# Patient Record
Sex: MALE | Race: WHITE | NOT HISPANIC OR LATINO | Employment: OTHER | URBAN - METROPOLITAN AREA
[De-identification: names, ages, dates, MRNs, and addresses within clinical notes are randomized per-mention and may not be internally consistent; named-entity substitution may affect disease eponyms.]

---

## 2020-09-08 ENCOUNTER — APPOINTMENT (OUTPATIENT)
Dept: RADIOLOGY | Facility: MEDICAL CENTER | Age: 65
DRG: 066 | End: 2020-09-08
Attending: EMERGENCY MEDICINE
Payer: MEDICARE

## 2020-09-08 ENCOUNTER — APPOINTMENT (OUTPATIENT)
Dept: CARDIOLOGY | Facility: MEDICAL CENTER | Age: 65
DRG: 066 | End: 2020-09-08
Attending: HOSPITALIST
Payer: MEDICARE

## 2020-09-08 ENCOUNTER — HOSPITAL ENCOUNTER (INPATIENT)
Facility: MEDICAL CENTER | Age: 65
LOS: 2 days | DRG: 066 | End: 2020-09-10
Attending: EMERGENCY MEDICINE | Admitting: HOSPITALIST
Payer: MEDICARE

## 2020-09-08 DIAGNOSIS — I63.9 CEREBROVASCULAR ACCIDENT (CVA), UNSPECIFIED MECHANISM (HCC): ICD-10-CM

## 2020-09-08 DIAGNOSIS — I63.9 ACUTE CVA (CEREBROVASCULAR ACCIDENT) (HCC): ICD-10-CM

## 2020-09-08 DIAGNOSIS — R47.01 EXPRESSIVE APHASIA: ICD-10-CM

## 2020-09-08 PROBLEM — E66.9 OBESITY: Status: ACTIVE | Noted: 2020-09-08

## 2020-09-08 PROBLEM — D72.829 LEUKOCYTOSIS: Status: ACTIVE | Noted: 2020-09-08

## 2020-09-08 PROBLEM — Z86.711 HISTORY OF PULMONARY EMBOLISM: Status: ACTIVE | Noted: 2020-09-08

## 2020-09-08 PROBLEM — R73.9 HYPERGLYCEMIA: Status: ACTIVE | Noted: 2020-09-08

## 2020-09-08 LAB
ABO + RH BLD: NORMAL
ABO GROUP BLD: NORMAL
ALBUMIN SERPL BCP-MCNC: 4.4 G/DL (ref 3.2–4.9)
ALBUMIN/GLOB SERPL: 1 G/DL
ALP SERPL-CCNC: 80 U/L (ref 30–99)
ALT SERPL-CCNC: 32 U/L (ref 2–50)
ANION GAP SERPL CALC-SCNC: 16 MMOL/L (ref 7–16)
APPEARANCE UR: CLEAR
APTT PPP: 24.9 SEC (ref 24.7–36)
AST SERPL-CCNC: 25 U/L (ref 12–45)
BACTERIA #/AREA URNS HPF: ABNORMAL /HPF
BASOPHILS # BLD AUTO: 0.2 % (ref 0–1.8)
BASOPHILS # BLD: 0.03 K/UL (ref 0–0.12)
BILIRUB SERPL-MCNC: 0.6 MG/DL (ref 0.1–1.5)
BILIRUB UR QL STRIP.AUTO: NEGATIVE
BLD GP AB SCN SERPL QL: NORMAL
BUN SERPL-MCNC: 19 MG/DL (ref 8–22)
CALCIUM SERPL-MCNC: 9.8 MG/DL (ref 8.4–10.2)
CHLORIDE SERPL-SCNC: 101 MMOL/L (ref 96–112)
CK MB SERPL-MCNC: 1.8 NG/ML (ref 0–5)
CO2 SERPL-SCNC: 22 MMOL/L (ref 20–33)
COLOR UR: YELLOW
COVID ORDER STATUS COVID19: NORMAL
CREAT SERPL-MCNC: 1.2 MG/DL (ref 0.5–1.4)
EKG IMPRESSION: NORMAL
EOSINOPHIL # BLD AUTO: 0.01 K/UL (ref 0–0.51)
EOSINOPHIL NFR BLD: 0.1 % (ref 0–6.9)
EPI CELLS #/AREA URNS HPF: ABNORMAL /HPF
ERYTHROCYTE [DISTWIDTH] IN BLOOD BY AUTOMATED COUNT: 39.9 FL (ref 35.9–50)
EST. AVERAGE GLUCOSE BLD GHB EST-MCNC: 117 MG/DL
GLOBULIN SER CALC-MCNC: 4.4 G/DL (ref 1.9–3.5)
GLUCOSE BLD-MCNC: 111 MG/DL (ref 65–99)
GLUCOSE SERPL-MCNC: 121 MG/DL (ref 65–99)
GLUCOSE UR STRIP.AUTO-MCNC: NEGATIVE MG/DL
HBA1C MFR BLD: 5.7 % (ref 0–5.6)
HCT VFR BLD AUTO: 51.4 % (ref 42–52)
HGB BLD-MCNC: 17.4 G/DL (ref 14–18)
IMM GRANULOCYTES # BLD AUTO: 0.06 K/UL (ref 0–0.11)
IMM GRANULOCYTES NFR BLD AUTO: 0.5 % (ref 0–0.9)
INR PPP: 0.98 (ref 0.87–1.13)
KETONES UR STRIP.AUTO-MCNC: 15 MG/DL
LEUKOCYTE ESTERASE UR QL STRIP.AUTO: NEGATIVE
LV EJECT FRACT  99904: 35
LV EJECT FRACT MOD 2C 99903: 39.02
LV EJECT FRACT MOD 4C 99902: 40.49
LV EJECT FRACT MOD BP 99901: 40.87
LYMPHOCYTES # BLD AUTO: 2.37 K/UL (ref 1–4.8)
LYMPHOCYTES NFR BLD: 19.2 % (ref 22–41)
MCH RBC QN AUTO: 28.5 PG (ref 27–33)
MCHC RBC AUTO-ENTMCNC: 33.9 G/DL (ref 33.7–35.3)
MCV RBC AUTO: 84.1 FL (ref 81.4–97.8)
MICRO URNS: ABNORMAL
MONOCYTES # BLD AUTO: 0.7 K/UL (ref 0–0.85)
MONOCYTES NFR BLD AUTO: 5.7 % (ref 0–13.4)
MUCOUS THREADS #/AREA URNS HPF: ABNORMAL /HPF
MYOGLOBIN SERPL-MCNC: 47 NG/ML (ref 0–110)
NEUTROPHILS # BLD AUTO: 9.15 K/UL (ref 1.82–7.42)
NEUTROPHILS NFR BLD: 74.3 % (ref 44–72)
NITRITE UR QL STRIP.AUTO: NEGATIVE
NRBC # BLD AUTO: 0 K/UL
NRBC BLD-RTO: 0 /100 WBC
PH UR STRIP.AUTO: 5 [PH] (ref 5–8)
PLATELET # BLD AUTO: 290 K/UL (ref 164–446)
PMV BLD AUTO: 10.9 FL (ref 9–12.9)
POTASSIUM SERPL-SCNC: 4.4 MMOL/L (ref 3.6–5.5)
PROT SERPL-MCNC: 8.8 G/DL (ref 6–8.2)
PROT UR QL STRIP: 30 MG/DL
PROTHROMBIN TIME: 12.7 SEC (ref 12–14.6)
RBC # BLD AUTO: 6.11 M/UL (ref 4.7–6.1)
RBC # URNS HPF: ABNORMAL /HPF
RBC UR QL AUTO: ABNORMAL
RH BLD: NORMAL
SODIUM SERPL-SCNC: 139 MMOL/L (ref 135–145)
SP GR UR STRIP.AUTO: <=1.005
TROPONIN T SERPL-MCNC: 13 NG/L (ref 6–19)
WBC # BLD AUTO: 12.3 K/UL (ref 4.8–10.8)
WBC #/AREA URNS HPF: ABNORMAL /HPF

## 2020-09-08 PROCEDURE — 0042T CT-CEREBRAL PERFUSION ANALYSIS: CPT

## 2020-09-08 PROCEDURE — 81001 URINALYSIS AUTO W/SCOPE: CPT

## 2020-09-08 PROCEDURE — 93306 TTE W/DOPPLER COMPLETE: CPT | Mod: 26 | Performed by: INTERNAL MEDICINE

## 2020-09-08 PROCEDURE — 85730 THROMBOPLASTIN TIME PARTIAL: CPT

## 2020-09-08 PROCEDURE — 71045 X-RAY EXAM CHEST 1 VIEW: CPT

## 2020-09-08 PROCEDURE — U0003 INFECTIOUS AGENT DETECTION BY NUCLEIC ACID (DNA OR RNA); SEVERE ACUTE RESPIRATORY SYNDROME CORONAVIRUS 2 (SARS-COV-2) (CORONAVIRUS DISEASE [COVID-19]), AMPLIFIED PROBE TECHNIQUE, MAKING USE OF HIGH THROUGHPUT TECHNOLOGIES AS DESCRIBED BY CMS-2020-01-R: HCPCS

## 2020-09-08 PROCEDURE — 83036 HEMOGLOBIN GLYCOSYLATED A1C: CPT

## 2020-09-08 PROCEDURE — 700105 HCHG RX REV CODE 258: Performed by: HOSPITALIST

## 2020-09-08 PROCEDURE — 700102 HCHG RX REV CODE 250 W/ 637 OVERRIDE(OP): Performed by: HOSPITALIST

## 2020-09-08 PROCEDURE — 86850 RBC ANTIBODY SCREEN: CPT

## 2020-09-08 PROCEDURE — 86900 BLOOD TYPING SEROLOGIC ABO: CPT

## 2020-09-08 PROCEDURE — C9803 HOPD COVID-19 SPEC COLLECT: HCPCS | Performed by: HOSPITALIST

## 2020-09-08 PROCEDURE — A9270 NON-COVERED ITEM OR SERVICE: HCPCS | Performed by: HOSPITALIST

## 2020-09-08 PROCEDURE — 99285 EMERGENCY DEPT VISIT HI MDM: CPT

## 2020-09-08 PROCEDURE — 80053 COMPREHEN METABOLIC PANEL: CPT

## 2020-09-08 PROCEDURE — 86901 BLOOD TYPING SEROLOGIC RH(D): CPT

## 2020-09-08 PROCEDURE — 83874 ASSAY OF MYOGLOBIN: CPT

## 2020-09-08 PROCEDURE — 700117 HCHG RX CONTRAST REV CODE 255: Performed by: HOSPITALIST

## 2020-09-08 PROCEDURE — 93005 ELECTROCARDIOGRAM TRACING: CPT | Performed by: EMERGENCY MEDICINE

## 2020-09-08 PROCEDURE — 36415 COLL VENOUS BLD VENIPUNCTURE: CPT

## 2020-09-08 PROCEDURE — 84484 ASSAY OF TROPONIN QUANT: CPT

## 2020-09-08 PROCEDURE — 85025 COMPLETE CBC W/AUTO DIFF WBC: CPT

## 2020-09-08 PROCEDURE — 93306 TTE W/DOPPLER COMPLETE: CPT

## 2020-09-08 PROCEDURE — 85610 PROTHROMBIN TIME: CPT

## 2020-09-08 PROCEDURE — 700117 HCHG RX CONTRAST REV CODE 255: Performed by: EMERGENCY MEDICINE

## 2020-09-08 PROCEDURE — 94760 N-INVAS EAR/PLS OXIMETRY 1: CPT

## 2020-09-08 PROCEDURE — 70496 CT ANGIOGRAPHY HEAD: CPT

## 2020-09-08 PROCEDURE — 99223 1ST HOSP IP/OBS HIGH 75: CPT | Mod: AI | Performed by: HOSPITALIST

## 2020-09-08 PROCEDURE — 70498 CT ANGIOGRAPHY NECK: CPT

## 2020-09-08 PROCEDURE — 70450 CT HEAD/BRAIN W/O DYE: CPT

## 2020-09-08 PROCEDURE — 770020 HCHG ROOM/CARE - TELE (206)

## 2020-09-08 PROCEDURE — 82962 GLUCOSE BLOOD TEST: CPT

## 2020-09-08 PROCEDURE — 82553 CREATINE MB FRACTION: CPT

## 2020-09-08 RX ORDER — ASPIRIN 81 MG/1
324 TABLET, CHEWABLE ORAL DAILY
Status: DISCONTINUED | OUTPATIENT
Start: 2020-09-08 | End: 2020-09-10 | Stop reason: HOSPADM

## 2020-09-08 RX ORDER — ONDANSETRON 2 MG/ML
4 INJECTION INTRAMUSCULAR; INTRAVENOUS EVERY 4 HOURS PRN
Status: DISCONTINUED | OUTPATIENT
Start: 2020-09-08 | End: 2020-09-10 | Stop reason: HOSPADM

## 2020-09-08 RX ORDER — ASPIRIN 325 MG
325 TABLET ORAL DAILY
Status: DISCONTINUED | OUTPATIENT
Start: 2020-09-08 | End: 2020-09-10 | Stop reason: HOSPADM

## 2020-09-08 RX ORDER — SODIUM CHLORIDE 9 MG/ML
INJECTION, SOLUTION INTRAVENOUS CONTINUOUS
Status: DISCONTINUED | OUTPATIENT
Start: 2020-09-08 | End: 2020-09-08

## 2020-09-08 RX ORDER — HYDRALAZINE HYDROCHLORIDE 20 MG/ML
10 INJECTION INTRAMUSCULAR; INTRAVENOUS
Status: DISCONTINUED | OUTPATIENT
Start: 2020-09-08 | End: 2020-09-10 | Stop reason: HOSPADM

## 2020-09-08 RX ORDER — LABETALOL HYDROCHLORIDE 5 MG/ML
10 INJECTION, SOLUTION INTRAVENOUS EVERY 4 HOURS PRN
Status: DISCONTINUED | OUTPATIENT
Start: 2020-09-08 | End: 2020-09-10 | Stop reason: HOSPADM

## 2020-09-08 RX ORDER — ACETAMINOPHEN 325 MG/1
650 TABLET ORAL EVERY 6 HOURS PRN
Status: DISCONTINUED | OUTPATIENT
Start: 2020-09-08 | End: 2020-09-10 | Stop reason: HOSPADM

## 2020-09-08 RX ORDER — ONDANSETRON 4 MG/1
4 TABLET, ORALLY DISINTEGRATING ORAL EVERY 4 HOURS PRN
Status: DISCONTINUED | OUTPATIENT
Start: 2020-09-08 | End: 2020-09-10 | Stop reason: HOSPADM

## 2020-09-08 RX ORDER — POLYETHYLENE GLYCOL 3350 17 G/17G
1 POWDER, FOR SOLUTION ORAL
Status: DISCONTINUED | OUTPATIENT
Start: 2020-09-08 | End: 2020-09-10 | Stop reason: HOSPADM

## 2020-09-08 RX ORDER — BISACODYL 10 MG
10 SUPPOSITORY, RECTAL RECTAL
Status: DISCONTINUED | OUTPATIENT
Start: 2020-09-08 | End: 2020-09-10 | Stop reason: HOSPADM

## 2020-09-08 RX ORDER — ASPIRIN 600 MG/1
300 SUPPOSITORY RECTAL DAILY
Status: DISCONTINUED | OUTPATIENT
Start: 2020-09-08 | End: 2020-09-10 | Stop reason: HOSPADM

## 2020-09-08 RX ORDER — AMOXICILLIN 250 MG
2 CAPSULE ORAL 2 TIMES DAILY
Status: DISCONTINUED | OUTPATIENT
Start: 2020-09-08 | End: 2020-09-10 | Stop reason: HOSPADM

## 2020-09-08 RX ORDER — ATORVASTATIN CALCIUM 40 MG/1
80 TABLET, FILM COATED ORAL EVERY EVENING
Status: DISCONTINUED | OUTPATIENT
Start: 2020-09-08 | End: 2020-09-10 | Stop reason: HOSPADM

## 2020-09-08 RX ADMIN — HUMAN ALBUMIN MICROSPHERES AND PERFLUTREN 3 ML: 10; .22 INJECTION, SOLUTION INTRAVENOUS at 16:42

## 2020-09-08 RX ADMIN — IOHEXOL 80 ML: 350 INJECTION, SOLUTION INTRAVENOUS at 11:51

## 2020-09-08 RX ADMIN — ATORVASTATIN CALCIUM 80 MG: 40 TABLET, FILM COATED ORAL at 17:02

## 2020-09-08 RX ADMIN — IOHEXOL 40 ML: 350 INJECTION, SOLUTION INTRAVENOUS at 11:51

## 2020-09-08 RX ADMIN — ASPIRIN 81 MG CHEWABLE TABLET 324 MG: 81 TABLET CHEWABLE at 16:59

## 2020-09-08 RX ADMIN — SODIUM CHLORIDE: 9 INJECTION, SOLUTION INTRAVENOUS at 17:07

## 2020-09-08 SDOH — HEALTH STABILITY: MENTAL HEALTH: HOW OFTEN DO YOU HAVE A DRINK CONTAINING ALCOHOL?: NEVER

## 2020-09-08 SDOH — HEALTH STABILITY: MENTAL HEALTH: HOW OFTEN DO YOU HAVE 6 OR MORE DRINKS ON ONE OCCASION?: NEVER

## 2020-09-08 ASSESSMENT — LIFESTYLE VARIABLES
HAVE PEOPLE ANNOYED YOU BY CRITICIZING YOUR DRINKING: NO
EVER FELT BAD OR GUILTY ABOUT YOUR DRINKING: NO
TOTAL SCORE: 0
CONSUMPTION TOTAL: NEGATIVE
ON A TYPICAL DAY WHEN YOU DRINK ALCOHOL HOW MANY DRINKS DO YOU HAVE: 0
HOW MANY TIMES IN THE PAST YEAR HAVE YOU HAD 5 OR MORE DRINKS IN A DAY: 0
TOTAL SCORE: 0
AVERAGE NUMBER OF DAYS PER WEEK YOU HAVE A DRINK CONTAINING ALCOHOL: 0
HAVE YOU EVER FELT YOU SHOULD CUT DOWN ON YOUR DRINKING: NO
DO YOU DRINK ALCOHOL: NO
EVER HAD A DRINK FIRST THING IN THE MORNING TO STEADY YOUR NERVES TO GET RID OF A HANGOVER: NO
TOTAL SCORE: 0
ALCOHOL_USE: NO

## 2020-09-08 ASSESSMENT — ENCOUNTER SYMPTOMS
MUSCULOSKELETAL NEGATIVE: 1
NERVOUS/ANXIOUS: 0
CONSTIPATION: 0
WHEEZING: 0
FEVER: 0
DIARRHEA: 0
COUGH: 0
PSYCHIATRIC NEGATIVE: 1
NAUSEA: 0
GASTROINTESTINAL NEGATIVE: 1
BRUISES/BLEEDS EASILY: 0
SEIZURES: 0
DIAPHORESIS: 0
LOSS OF CONSCIOUSNESS: 0
FOCAL WEAKNESS: 0
DIZZINESS: 0
SPEECH CHANGE: 1
VOMITING: 0
PALPITATIONS: 0
EYES NEGATIVE: 1
ABDOMINAL PAIN: 0
HEMOPTYSIS: 0
HEADACHES: 0
CARDIOVASCULAR NEGATIVE: 1
HEARTBURN: 0
BLOOD IN STOOL: 0
DOUBLE VISION: 0
CONSTITUTIONAL NEGATIVE: 1
RESPIRATORY NEGATIVE: 1
CHILLS: 0

## 2020-09-08 ASSESSMENT — COGNITIVE AND FUNCTIONAL STATUS - GENERAL
DAILY ACTIVITIY SCORE: 24
MOBILITY SCORE: 24
SUGGESTED CMS G CODE MODIFIER MOBILITY: CH
SUGGESTED CMS G CODE MODIFIER DAILY ACTIVITY: CH

## 2020-09-08 ASSESSMENT — PATIENT HEALTH QUESTIONNAIRE - PHQ9
SUM OF ALL RESPONSES TO PHQ9 QUESTIONS 1 AND 2: 0
1. LITTLE INTEREST OR PLEASURE IN DOING THINGS: NOT AT ALL
2. FEELING DOWN, DEPRESSED, IRRITABLE, OR HOPELESS: NOT AT ALL

## 2020-09-08 ASSESSMENT — COPD QUESTIONNAIRES
COPD SCREENING SCORE: 2
HAVE YOU SMOKED AT LEAST 100 CIGARETTES IN YOUR ENTIRE LIFE: NO/DON'T KNOW
DURING THE PAST 4 WEEKS HOW MUCH DID YOU FEEL SHORT OF BREATH: NONE/LITTLE OF THE TIME
DO YOU EVER COUGH UP ANY MUCUS OR PHLEGM?: NO/ONLY WITH OCCASIONAL COLDS OR INFECTIONS

## 2020-09-08 ASSESSMENT — FIBROSIS 4 INDEX: FIB4 SCORE: 1.01

## 2020-09-08 NOTE — CARE PLAN
Problem: Communication  Goal: The ability to communicate needs accurately and effectively will improve  Outcome: PROGRESSING AS EXPECTED  Intervention: [place] patient and significant other/support system to call light to alert staff of needs  Flowsheets (Taken 9/8/2020 8754)  Oriented to:: All of the Following : Location of Bathroom, Visiting Policy, Unit Routine, Call Light and Bedside Controls, Bedside Rail Policy, Smoking Policy, Rights and Responsibilities, Bedside Report, and Patient Education Notebook  Note: Global aphasia. Pt able to respond to questions by nodding and shaking head. Oriented to room. Updated on POC. Denies questions. SonRic at bedside also denies questions.     Problem: Safety  Goal: Will remain free from falls  Outcome: PROGRESSING AS EXPECTED  Intervention: Implement fall precautions  Flowsheets (Taken 9/8/2020 9335)  Environmental Precautions:   Treaded Slipper Socks on Patient   Personal Belongings, Wastebasket, Call Bell etc. in Easy Reach   Transferred to Stronger Side   Report Given to Other Health Care Providers Regarding Fall Risk   Bed in Low Position   Communication Sign for Patients & Families   Mobility Assessed & Appropriate Sign Placed  Note: Gait steady, pt ambulatory. Instructed to call for assistance ambulating. Bed in lowest position, wheels locked. Call bell within reach, calls appropriately for assistance. Fall prevention education provided, denies questions. Hourly rounding. Fall/safety precautions in place. Pt remains free from fall/injury at this time.

## 2020-09-08 NOTE — ASSESSMENT & PLAN NOTE
White blood cell count is 12.3.  Patient at this point will be monitored on his white blood cell count most likely a reactive leukocytosis with a stroke.  UA without evidence of infection.

## 2020-09-08 NOTE — ED NOTES
Med rec updated and complete  Allergies reviewed  Interviewed pt with son at bedside with permission from pt.  Pt reports no prescription medications,OTC's, and vitamins  Pt reports no antibiotics in the last 2 weeks

## 2020-09-08 NOTE — PROGRESS NOTES
Telemetry Shift Summary    Rhythm SR  HR Range 80s-90s  Ectopy O-PVC  Measurements 0.14/0.08/0.38        Normal Values  Rhythm SR  HR Range    Measurements 0.12-0.20 / 0.06-0.10  / 0.30-0.52

## 2020-09-08 NOTE — DISCHARGE PLANNING
[facility] Transitional [facility] Coordination     Referral from:  Dr. Crandall  Facesheet indicates:  Medicare/[employer]  Potential Rehab Diagnosis:  Stroke    Chart review indicates patient on going medical management, may have therapy needs    D/C support:  To be determined     Physiatry consultation pended per protocol while waiting for additional information.  Workup ongoing -  MRI brain, echocardiogram and therapy evals pending.  TCC will follow.         Thank you for the referral.

## 2020-09-08 NOTE — ASSESSMENT & PLAN NOTE
Expressive aphasia is his major deficit.   He will placed on aspirin and statin.    We will start permissive hypertension.    PT/OT/SLP  He may need to go to a rehab facility.    MRI confirms stroke : Moderate area of acute ischemia in the LEFT frontal lobe, Tiny focus of acute ischemia in the LEFT parietal cortex, Tiny focus of acute ischemia in the RIGHT frontal cortex  Echo shows EF 35%  Tele without evidence of atrial fibrillation.

## 2020-09-08 NOTE — H&P
Hospital Medicine History & Physical Note    Date of Service  9/8/2020    Primary Care Physician  No primary care provider on file.    Consultants  Neurology    Code Status  Full Code    Chief Complaint  Chief Complaint   Patient presents with   • ALOC   • Possible Stroke       History of Presenting Illness  65 y.o. male who presented 9/8/2020 with at least 12 hours of speech loss and inability to write or communicate with other people.  Apparently the patient was last seen about 48 hours ago normal by his daughter.  After that he did not communicate with anybody today he showed up at the front step of his son's house here in [place].  The patient drove all the way from [place] left [place] about 3 AM.  At this point unfortunately he is outside of the window for TPA or any kind of interventional radiological intervention.  Patient at this point will need to be evaluated for swallowing difficulties and will need speech therapy occupational therapy physical therapy evaluations.  Patient will be admitted to the medical floor.  Patient will be placed on aspirin and statin.  Neurology was consulted they do not have any additional recommendations.  Patient's disposition will most likely be to an extended care care facility versus a rehab facility for ongoing therapies.    Review of Systems  Review of Systems   Constitutional: Negative.  Negative for chills, diaphoresis and fever.   HENT: Negative.    Eyes: Negative.  Negative for double vision.   Respiratory: Negative.  Negative for cough, hemoptysis and wheezing.    Cardiovascular: Negative.  Negative for chest pain, palpitations and leg swelling.   Gastrointestinal: Negative.  Negative for abdominal pain, blood in stool, constipation, diarrhea, heartburn, nausea and vomiting.   Genitourinary: Negative.  Negative for frequency, hematuria and urgency.   Musculoskeletal: Negative.  Negative for joint pain.   Skin: Negative.  Negative for itching and rash.    Neurological: Positive for speech change. Negative for dizziness, focal weakness, seizures, loss of consciousness and headaches.   Endo/Heme/Allergies: Negative.  Does not bruise/bleed easily.   Psychiatric/Behavioral: Negative.  Negative for suicidal ideas. The patient is not nervous/anxious.    All other systems reviewed and are negative.      Past Medical History   has a past medical history of Pulmonary embolism (HCC) (2008).    Surgical History  Left ankle repair after traumatic injury from a ladder fall.    Family History  Patient is unable to provide any family history due to his speech difficulties.    Social History   reports that he has never smoked. He does not have any smokeless tobacco history on file. He reports previous alcohol use. He reports that he does not use drugs.    Allergies  Allergies   Allergen Reactions   • Shellfish Allergy Hives       Medications  None       Physical Exam  Temp:  [35.9 °C (96.7 °F)] 35.9 °C (96.7 °F)  Pulse:  [] 99  Resp:  [16-28] 28  BP: (121-191)/() 163/87  SpO2:  [94 %-95 %] 95 %    Physical Exam  Vitals signs and nursing note reviewed. Exam conducted with a chaperone present.   Constitutional:       Appearance: Normal appearance. He is well-developed. He is obese. He is not ill-appearing.   HENT:      Head: Normocephalic and atraumatic.      Right Ear: External ear normal.      Left Ear: External ear normal.      Nose: Nose normal.      Mouth/Throat:      Mouth: Mucous membranes are moist.      Pharynx: Oropharynx is clear.   Eyes:      Extraocular Movements: Extraocular movements intact.      Conjunctiva/sclera: Conjunctivae normal.      Pupils: Pupils are equal, round, and reactive to light.   Neck:      Musculoskeletal: Normal range of motion and neck supple.      Thyroid: No thyromegaly.      Vascular: No JVD.   Cardiovascular:      Rate and Rhythm: Normal rate and regular rhythm.      Pulses: Normal pulses.      Heart sounds: Normal heart sounds.    Pulmonary:      Effort: Pulmonary effort is normal.      Breath sounds: Normal breath sounds.   Chest:      Chest wall: No tenderness.   Abdominal:      General: Abdomen is flat. Bowel sounds are normal. There is no distension.      Palpations: Abdomen is soft. There is no mass.      Tenderness: There is no abdominal tenderness. There is no guarding or rebound.   Musculoskeletal: Normal range of motion.   Lymphadenopathy:      Cervical: No cervical adenopathy.   Skin:     General: Skin is warm and dry.      Capillary Refill: Capillary refill takes 2 to 3 seconds.      Findings: No rash.   Neurological:      General: No focal deficit present.      Mental Status: He is alert.      GCS: GCS eye subscore is 4. GCS verbal subscore is 1. GCS motor subscore is 6.      Cranial Nerves: No cranial nerve deficit.      Motor: Motor function is intact.      Coordination: Coordination is intact.      Gait: Gait is intact.      Deep Tendon Reflexes: Reflexes are normal and symmetric.      Reflex Scores:       Tricep reflexes are 2+ on the right side and 2+ on the left side.       Bicep reflexes are 2+ on the right side and 2+ on the left side.       Brachioradialis reflexes are 2+ on the right side and 2+ on the left side.       Patellar reflexes are 2+ on the right side and 2+ on the left side.       Achilles reflexes are 2+ on the right side and 2+ on the left side.     Comments: Aphasia    Psychiatric:         Mood and Affect: Mood normal.         Behavior: Behavior normal.         Thought Content: Thought content normal.         Judgment: Judgment normal.         Laboratory:  Recent Labs     09/08/20  1148   WBC 12.3*   RBC 6.11*   HEMOGLOBIN 17.4   HEMATOCRIT 51.4   MCV 84.1   MCH 28.5   MCHC 33.9   RDW 39.9   PLATELETCT 290   MPV 10.9     Recent Labs     09/08/20  1148   SODIUM 139   POTASSIUM 4.4   CHLORIDE 101   CO2 22   GLUCOSE 121*   BUN 19   CREATININE 1.20   CALCIUM 9.8     Recent Labs     09/08/20  1148   ALTSGPT 32    ASTSGOT 25   ALKPHOSPHAT 80   TBILIRUBIN 0.6   GLUCOSE 121*     Recent Labs     09/08/20  1148   APTT 24.9   INR 0.98     No results for input(s): NTPROBNP in the last 72 hours.      Recent Labs     09/08/20  1148   TROPONINT 13       Imaging:  DX-CHEST-PORTABLE (1 VIEW)   Final Result      1.  Cardiomegaly.      2.  Mild bilateral perihilar interstitial opacities.      CT-CTA NECK WITH & W/O-POST PROCESSING   Final Result      1.  Mild bilateral internal carotid artery atherosclerotic plaque without significant stenosis      2.  Left vertebral artery dissection which is probably acute and less likely subacute      3.  Of note is a left vertebral artery dissection does not correspond anatomically to the area of infarct on the cerebral perfusion scan      4.  MRI brain with and without contrast recommended for further assessment to assess for posterior circulation infarct      Findings were discussed with AGUSTO TODD on 9/8/2020 12:45 PM.      CT-CTA HEAD WITH & W/O-POST PROCESS   Final Result      CT angiogram of the [place] [facility] [place] within normal limits.      CT-CEREBRAL PERFUSION ANALYSIS   Final Result      1.  Cerebral blood flow less than 30% likely representing completed infarct = 0 mL.      2.  T Max more than 6 seconds likely representing combination of completed infarct and ischemia = 45 mL.      3.  Mismatched volume likely representing ischemic brain/penumbra = incident      4.  Please note that the cerebral perfusion was performed on the limited brain tissue around the basal ganglia region. Infarct/ischemia outside the CT perfusion sections can be missed in this study.      CT-HEAD W/O   Final Result   Addendum 1 of 1   The left frontotemporal infarct is most likely subacute and not old      Final      EC-ECHOCARDIOGRAM COMPLETE W/O CONT    (Results Pending)   MR-BRAIN-W/O    (Results Pending)         Assessment/Plan:  I anticipate this patient will require at least two midnights for appropriate  medical management, necessitating inpatient admission.    * Acute CVA (cerebrovascular accident) (HCC)  Assessment & Plan  Patient lives in [place], about 12 to 14 hours ago the patient started to have some neurological symptoms which affected his speech.  The patient at that point decided to drive up to [employer] to get help as he wanted to be near his son.  The patient this morning showed up at his son's doorstep not being able to speak at all.  Patient is was brought into the emergency room by his son.  Patient was evaluated with imaging studies which shows a subacute left frontotemporal infarct.  It is an extensive infarct and in the deeper part of the brain.  The patient at this point has lost his ability to speak completely.  He will placed on aspirin and statin.  We will start permissive hypertension.  Will have PT OT evaluate him speech evaluate him he will need a cognitive evaluation as well.  He may need to go to a rehab facility.  Patient will be value with an MRI and echocardiogram.  He does have a history of pulmonary embolism which is about 10 years ago when he fell off a ladder and injured his left ankle.  He still has a large area with erythropoietin deposit indicating a previous ulcer in his left ankle.    Expressive aphasia  Assessment & Plan  Patient at this point has expressive aphasia.  Patient will be value with speech therapy  Patient will need most likely ongoing speech therapy to have him allow recovery from the stroke  Continue with aspirin and statin  Left frontotemporal CVA noted    History of pulmonary embolism  Assessment & Plan  Patient is a history of pulmonary embolism.  He was on oral anticoagulation for about a year    Obesity  Assessment & Plan  Weight is 112.1 kg  Needs outpatient weight loss management program.    Hyperglycemia  Assessment & Plan  Check a hemoglobin A1c most likely stress response is good blood sugar is 121 no previous history of hyperglycemia or  diabetes.    Leukocytosis  Assessment & Plan  White blood cell count is 12.3.  Patient at this point will be monitored on his white blood cell count most likely a reactive leukocytosis with a stroke although other etiology is possible.  We will check a urine analysis as well

## 2020-09-08 NOTE — PROGRESS NOTES
2 RN skin check complete  Device in place PIV, tele .  Skin assessed under devices c/d/i .  Confirmed pressure ulcers found on NA .  New potential pressure ulcers found on NA . Wound consult placed NA .  The following interventions in place pt ambulatory, repositions self independently, education provided, verbalized understanding.       R nostril with dried scab. Generalized dry, flaky skin. BLE discoloration, L > R. Otherwise skin remains intact.

## 2020-09-08 NOTE — ED PROVIDER NOTES
ED Provider Note    CHIEF COMPLAINT  Chief Complaint   Patient presents with   • ALOC   • Possible Stroke        HPI  Ze Wan is a 65 y.o. male who presents to the ED with his son.  The patient is globally a phasic.  Last time known well was about 2 days ago per the son.  The son states that the patient lives in [facility] and actually drove all the way up to [place] today which is about an 8-hour drive and when he arrived at the son's house he was in the current status with global aphasia.  That was about 20 minutes ago when he arrived the son was so concerned brought the patient to the emergency department for evaluation.  Upon arrival the patient is unable to verbalize his complaints because he does have globally aphasia denies any pain denies any vision changes denies any chest pain or shortness of breath but when asked when this started the patient does not really know it may have been yesterday he states that yesterday it is a possibility of when it started.  Currently we are unable to get any further history from the patient due to his global aphasia.    REVIEW OF SYSTEMS  See HPI for further details.  Unable to be obtained due to the patient's global aphasia but he is shakes his head no for pain, fevers, chills, cough.    PAST MEDICAL HISTORY  Past Medical History:   Diagnosis Date   • Pulmonary embolism (HCC) 2008     Hypertension per the son  FAMILY HISTORY  History reviewed. No pertinent family history.  There is a family history of CVAs and hypertension as well as heart attacks per the son  SOCIAL HISTORY  Social History     Socioeconomic History   • Marital status: Not on file     Spouse name: Not on file   • Number of children: Not on file   • Years of education: Not on file   • Highest education level: Not on file   Occupational History   • Not on file   Social Needs   • Financial resource strain: Not on file   • Food insecurity     Worry: Not on file     Inability: Not on file   •  Transportation needs     Medical: Not on file     Non-medical: Not on file   Tobacco Use   • Smoking status: Never Smoker   Substance and Sexual Activity   • Alcohol use: Not Currently     Frequency: Never     Binge frequency: Never   • Drug use: Never   • Sexual activity: Not on file   Lifestyle   • Physical activity     Days per week: Not on file     Minutes per session: Not on file   • Stress: Not on file   Relationships   • Social connections     Talks on phone: Not on file     Gets together: Not on file     Attends [employer] service: Not on file     Active member of club or organization: Not on file     Attends meetings of clubs or organizations: Not on file     Relationship status: Not on file   • Intimate partner violence     Fear of current or ex partner: Not on file     Emotionally abused: Not on file     Physically abused: Not on file     Forced sexual activity: Not on file   Other Topics Concern   • Not on file   Social History Narrative   • Not on file      No primary care provider on file.  Patient drinks alcohol occasionally according to the son denies any overt drug or tobacco use per the son    SURGICAL HISTORY  History reviewed. No pertinent surgical history.    CURRENT MEDICATIONS  Home Medications     Reviewed by Fanny Priest (Pharmacy Tech) on 09/08/20 at 1205  Med List Status: Complete   Medication Last Dose Status        Patient Eliel Taking any Medications                   Unknown at this time    ALLERGIES  Allergies   Allergen Reactions   • Shellfish Allergy Hives       PHYSICAL EXAM  VITAL SIGNS: BP (!) 191/107   Pulse 89   Temp 35.9 °C (96.7 °F) (Temporal)   Resp 18   Wt 112.1 kg (247 lb 0.4 oz)   SpO2 95%   Pulse Oximetry was obtained. It showed a reading of Pulse Oximetry: 94 %.  I interpreted this as non-hypoxic.   Constitutional: Well developed, Well nourished, No acute distress, Non-toxic appearance.   HENT: Head is normal without signs of trauma bilateral TMs are  normal pharynx is moist mucous membranes  Eyes: Pupils are 3 mm equal round react to light extraocular motions are intact.  Conjunctiva normal  Neck: Normal range of motion, No tenderness, Supple, No stridor.   Cardiovascular: Normal heart rate, Normal rhythm, No murmurs, No rubs, No gallops. There are no carotid bruits.   Pulmonary: Normal breath sounds, No respiratory distress, No wheezing, No chest tenderness.   Abdomen: Bowel sounds normal, Soft, No tenderness, No masses, No pulsatile masses.   Skin: Warm, Dry, No erythema, No rash.   Back: No tenderness, No CVA tenderness.   Extremities: Intact distal pulses, No edema, No tenderness, No cyanosis, No clubbing.   Neurologic: Patient does follow commands very well he does have some right-sided facial droop but is able to smile with some mild labial flattening on the right side.  His tongue is straight.  He does follow visual cues.  He is able to raise both hands with no signs of drift as well as both lower extremities and no drifts he has equivocal Babinski's both lower extremities.  The patient is globally a phasic and unable to evaluate for dysarthria.  NIH score is 6 a score below   NIH Stroke Scale:    1a. Level of Consciousness:  Alert, Keenly Responsive    1b. LOC Questions (Month, age):  Answers Neither Question Correctly    1c. LOC Commands (Open/close eyes make fist/let go):  Performs Both Tasks Correctly    2.   Best Gaze (Eyes open - patient follows examiner's finger on face):  Normal  3.   Visual Fields (introduce visual stimulus/threat to patient's field quadrants):  No visual loss.   4.   Facial Paresis (Show teeth, raise eyebrows and squeeze eyes shut):  Normal Symmetrical Movements.  5a. Motor Arm - Left (Elevate arm to 90 degrees if patient is sitting, 45 degrees if  Supine):  No Drift. Limb Holds 90 (or 45) Degrees For Full 10 Seconds.  5b. Motor Arm - Right (Elevate arm to 90 degrees if patient is sitting, 45 degrees if supine): No Drift. Limb  Holds 90 (or 45) Degrees for Full 10 Seconds.  6a. Motor Leg - Left (Elevate leg 30 degrees with patient supine):  No Drift. Leg Holds 30-Degree Position for Full 5 Seconds.  6b. Motor Leg - Right  (Elevate leg 30 degrees with patient supine):  No Drift. Leg Holds 30-Degree Position for Full 5 Seconds.  7.   Limb Ataxia (Finger-nose, heel down shin):  Absent    8.   Sensory (Pin prick to face, arm, trunk and leg - compare side to side):  Normal. No Sensory Loss  9.  Best Language (Name item, describe a picture and read sentences):  [place], Global Aphasia. No Usable Speech or Auditory Comprehension  10. Dysarthria (Evaluate speech clarity by patient repeating listed words):  Intubated or Other Physical Barrier (Explain)  11. Extinction and Inattention (Use information from prior testing to identify neglect or double simultaneous stimuli testing):  No Abnormality    Total NIH Score: 5            Psychiatric: Affect normal, Judgment normal, Mood normal.     EKG  Interpreted below by myself    RADIOLOGY/PROCEDURES  DX-CHEST-PORTABLE (1 VIEW)   Final Result      1.  Cardiomegaly.      2.  Mild bilateral perihilar interstitial opacities.      CT-CTA NECK WITH & W/O-POST PROCESSING   Final Result      1.  Mild bilateral internal carotid artery atherosclerotic plaque without significant stenosis      2.  Left vertebral artery dissection which is probably acute and less likely subacute      3.  Of note is a left vertebral artery dissection does not correspond anatomically to the area of infarct on the cerebral perfusion scan      4.  MRI brain with and without contrast recommended for further assessment to assess for posterior circulation infarct      Findings were discussed with AGUSTO TODD on 9/8/2020 12:45 PM.      CT-CTA HEAD WITH & W/O-POST PROCESS   Final Result      CT angiogram of the [place] [facility] [place] within normal limits.      CT-CEREBRAL PERFUSION ANALYSIS   Final Result      1.  Cerebral blood flow less than  30% likely representing completed infarct = 0 mL.      2.  T Max more than 6 seconds likely representing combination of completed infarct and ischemia = 45 mL.      3.  Mismatched volume likely representing ischemic brain/penumbra = incident      4.  Please note that the cerebral perfusion was performed on the limited brain tissue around the basal ganglia region. Infarct/ischemia outside the CT perfusion sections can be missed in this study.      CT-HEAD W/O   Final Result   Addendum 1 of 1   The left frontotemporal infarct is most likely subacute and not old      Final            COURSE & MEDICAL DECISION MAKING  Pertinent Labs & Imaging studies reviewed. (See chart for details)  Patient presents to the ED for evaluation.  Initial NIH was 6 but since I cannot completely find a time of onset the patient was not initial alteplase candidate.  The patient went on to have perfusion studies as well as CT angiography with above results.  At this point there is no overt signs for interventional radiology intervention however there is a subacute infarct noted on the left frontal parietal region which is consistent with the patient's neurologic findings.  At this point I did consult Dr. Portillo and we will admit the patient to the hospitalist for further treatment and care.    FINAL IMPRESSION  1. Cerebrovascular accident (CVA), unspecified mechanism (HCC)             Electronically signed by: Cipriano Carson M.D., 9/8/2020 11:50 AM

## 2020-09-08 NOTE — ASSESSMENT & PLAN NOTE
Patient at this point has expressive aphasia.  Patient evaluated by speech therapy  Patient will need most likely ongoing speech therapy to have him allow recovery from the stroke  Continue with aspirin and statin  Left frontotemporal CVA noted

## 2020-09-08 NOTE — FLOWSHEET NOTE
09/08/20 1531   Events/Summary/Plan   Events/Summary/Plan RCP done, obtained from son at bedside   Skin Inspection Respiratory Device Intact   Vital Signs   Pulse 84   Respiration 18   Pulse Oximetry 94 %   $ Pulse Oximetry (Spot Check) Yes   Respiratory Assessment   Level of Consciousness Alert   Breath Sounds   RUL Breath Sounds Clear   RML Breath Sounds Diminished   RLL Breath Sounds Diminished   CUCO Breath Sounds Clear   LLL Breath Sounds Diminished   Oxygen   O2 (LPM) 0   O2 Delivery Device Room air w/o2 available

## 2020-09-08 NOTE — PROGRESS NOTES
Pt arrived to 310-2 from ED accompanied by supportive son, Ric. Alert. +expressive aphasia. Able to nod yes and shake head no. Ambulated from gurney to stretcher with supervision. Gait steady. VSS. Placed on tele. Oriented to room, call bell within reach. Updated on POC. Pending MRI and echo. Admit profile complete with pt and son with pt's permission.

## 2020-09-08 NOTE — ED TRIAGE NOTES
Pt presents with son from son's home. Pt drove in from [place], [place] 20 min ago and was confused, unable to speak, or talk. Unknown last normal.

## 2020-09-09 ENCOUNTER — APPOINTMENT (OUTPATIENT)
Dept: RADIOLOGY | Facility: MEDICAL CENTER | Age: 65
DRG: 066 | End: 2020-09-09
Attending: HOSPITALIST
Payer: MEDICARE

## 2020-09-09 LAB
CHOLEST SERPL-MCNC: 234 MG/DL (ref 100–199)
ERYTHROCYTE [DISTWIDTH] IN BLOOD BY AUTOMATED COUNT: 41.4 FL (ref 35.9–50)
HCT VFR BLD AUTO: 46.9 % (ref 42–52)
HDLC SERPL-MCNC: 36 MG/DL
HGB BLD-MCNC: 15.7 G/DL (ref 14–18)
LDLC SERPL CALC-MCNC: 166 MG/DL
MCH RBC QN AUTO: 28.7 PG (ref 27–33)
MCHC RBC AUTO-ENTMCNC: 33.5 G/DL (ref 33.7–35.3)
MCV RBC AUTO: 85.7 FL (ref 81.4–97.8)
PLATELET # BLD AUTO: 231 K/UL (ref 164–446)
PMV BLD AUTO: 11.2 FL (ref 9–12.9)
RBC # BLD AUTO: 5.47 M/UL (ref 4.7–6.1)
SARS-COV-2 RNA RESP QL NAA+PROBE: NOTDETECTED
SPECIMEN SOURCE: NORMAL
TRIGL SERPL-MCNC: 160 MG/DL (ref 0–149)
WBC # BLD AUTO: 10.6 K/UL (ref 4.8–10.8)

## 2020-09-09 PROCEDURE — 97165 OT EVAL LOW COMPLEX 30 MIN: CPT

## 2020-09-09 PROCEDURE — 80061 LIPID PANEL: CPT

## 2020-09-09 PROCEDURE — 97161 PT EVAL LOW COMPLEX 20 MIN: CPT

## 2020-09-09 PROCEDURE — 770020 HCHG ROOM/CARE - TELE (206)

## 2020-09-09 PROCEDURE — 96105 ASSESSMENT OF APHASIA: CPT

## 2020-09-09 PROCEDURE — A9270 NON-COVERED ITEM OR SERVICE: HCPCS | Performed by: HOSPITALIST

## 2020-09-09 PROCEDURE — 85027 COMPLETE CBC AUTOMATED: CPT

## 2020-09-09 PROCEDURE — 700102 HCHG RX REV CODE 250 W/ 637 OVERRIDE(OP): Performed by: HOSPITALIST

## 2020-09-09 PROCEDURE — 99233 SBSQ HOSP IP/OBS HIGH 50: CPT | Performed by: INTERNAL MEDICINE

## 2020-09-09 PROCEDURE — 92610 EVALUATE SWALLOWING FUNCTION: CPT

## 2020-09-09 PROCEDURE — 70551 MRI BRAIN STEM W/O DYE: CPT

## 2020-09-09 RX ADMIN — ATORVASTATIN CALCIUM 80 MG: 40 TABLET, FILM COATED ORAL at 17:40

## 2020-09-09 RX ADMIN — ASPIRIN 81 MG CHEWABLE TABLET 324 MG: 81 TABLET CHEWABLE at 05:04

## 2020-09-09 ASSESSMENT — COGNITIVE AND FUNCTIONAL STATUS - GENERAL
HELP NEEDED FOR BATHING: A LITTLE
MOBILITY SCORE: 24
SUGGESTED CMS G CODE MODIFIER DAILY ACTIVITY: CJ
TOILETING: A LITTLE
SUGGESTED CMS G CODE MODIFIER MOBILITY: CH
DRESSING REGULAR LOWER BODY CLOTHING: A LITTLE
DAILY ACTIVITIY SCORE: 21

## 2020-09-09 ASSESSMENT — ENCOUNTER SYMPTOMS
MYALGIAS: 0
HEADACHES: 0
NERVOUS/ANXIOUS: 0
NAUSEA: 0
CHILLS: 0
VOMITING: 0
CLAUDICATION: 0
HEARTBURN: 0
BLURRED VISION: 0
INSOMNIA: 0
ABDOMINAL PAIN: 0
DIZZINESS: 0
SENSORY CHANGE: 0
CONSTIPATION: 0
SHORTNESS OF BREATH: 0
DEPRESSION: 0
PHOTOPHOBIA: 0
COUGH: 0
FEVER: 0
DIARRHEA: 0
SPEECH CHANGE: 1

## 2020-09-09 ASSESSMENT — GAIT ASSESSMENTS
DEVIATION: NO DEVIATION
DISTANCE (FEET): 200
GAIT LEVEL OF ASSIST: INDEPENDENT

## 2020-09-09 ASSESSMENT — ACTIVITIES OF DAILY LIVING (ADL): TOILETING: INDEPENDENT

## 2020-09-09 NOTE — THERAPY
"Speech Language Pathology   Clinical Swallow Evaluation     Patient Name: Ze Wan  AGE:  65 y.o., SEX:  male  Medical Record #: [id number]  Today's Date: 9/9/2020     Precautions  Precautions: Swallow Precautions ( See Comments)  Comments: expressive/receptive aphasia    Assessment    66 y/o admitted on 9/8 for ALOC and stroke. MRI of brain on 9/9 found \"Moderate area of acute ischemia in the LEFT frontal lobe, Tiny focus of acute ischemia in the LEFT parietal cortex, Tiny focus of acute ischemia in the RIGHT frontal cortex, No hemorrhage.\" Not seen by SLP before.    Pt seen on this date for a clinical swallow evaluation. Pt awake and agreeable to the evaluation. Per RN, pt with intermittent coughing during meals and with PO medications. He endorsed no history of dysphagia and dentition intact. Right facial droop and weakness appreciated during the oral motor evaluation. Cough x1 appreciated prior to PO trials. No overt s/sx of aspiration appreciated with trials of MTL, apple sauce, pudding, and soft solids. Immediate coughing appreciated with trials of thin liquids which is concerning for aspiration. With trials of crackers, pt endorsed difficulty chewing, but reported no concerns with soft solids. No c/o globus with any texture and only minimal amount of oral residue appreciated with pudding. Pt had difficulty following commands for small bites/sips given receptive language deficits. At this time, recommend an SB6/MT2 diet with monitoring during meals and implementation of swallowing strategies (Small bites/sips, up at 90* during meals, slow rate, no straws, finger sweep right cheek for oral residue).    Plan    SB6/MT2 diet with monitoring during meals and implementation of swallowing strategies as mentioned above.    Recommend Speech Therapy 5 times per week until therapy goals are met for the following treatments:  Dysphagia Training, Comprehension Training, Expression Training, Reading Training, " Writing Training and Patient / Family / Caregiver Education.    Discharge Recommendations: (P) Recommend post-acute placement for additional speech therapy services prior to discharge home       Objective       09/09/20 1129   Precautions   Precautions Swallow Precautions ( See Comments)   Comments expressive/receptive aphasia   Vitals   O2 (LPM) 0   O2 Delivery Device None - Room Air   Pain 0 - 10 Group   Therapist Pain Assessment Post Activity Pain Same as Prior to Activity;Nurse Notified;0   Prior Living Situation   Housing / Facility 1 Story House   Lives with - Patient's Self Care Capacity Alone and Able to Care For Self   Prior Level Of Function   Communication Within Functional Limits   Swallow Within Functional Limits   Dentition Intact   Hearing Within Functional Limits for Evaluation   Vision Within Functional Limits for Evaluation   Patient's Primary Language English   Occupation (Pre-Hospital Vocational) Retired Due To Age   Oral Motor Eval    Is Patient Able to Complete Oral Motor Eval Yes but Impaired   Labial Function   Labial Structure At Rest Minimal   Labial Vowel Production / I /, / U / Moderate  (Right facial droop and reduced ROM)   Lingual Function   Lingual Protrude Within Functional Limits   Lingual Retract Within Functional Limits   Lateralization Minimal Right;Minimal Left  (reduced ROM)   Jaw   Bite ([place]) Within Functional Limits   Chew ([place]) Minimal   Velar Function   Velar Structure At Rest Within Functional Limits   Laryngeal Function   Voice Quality Severe   Excursion Upon Swallow Complete   Oral Food Presentation   Single Swallow Mildly Thick (2) - (Nectar Thick)  Within Functional Limits   Serial Swallow Mildly Thick (2) - (Nectar Thick) Within Functional Limits   Single Swallow Thin (0) Minimal   Liquidised (3) Within Functional Limits   Pureed (4) Within Functional Limits   Soft & Bite-Sized (6) - (Dysphagia III) Within Functional Limits   Regular (7) Minimal   Tracheostomy    Tracheostomy  No   Dysphagia Strategies / Recommendations   Strategies / Interventions Recommended (Yes / No) Yes   Compensatory Strategies Monitor During Meals;Head of Bed 90 Degrees During Eating / Drinking;No Straws;Single Sips / Bites   Diet / Liquid Recommendation Soft & Bite-Sized (6) - (Dysphagia III);Mildly Thick (2) - (Nectar Thick)   Medication Administration  Float Whole with Puree   Therapy Interventions Dysphagia Therapy By Speech Language Pathologist;Pharyngeal / Laryngeal Exercises;Oral Motor Exercises   Short Term Goals   Short Term Goal # 1 Pt will follow simple 1 step commands with >80% accuracy and min clinician cueing   Short Term Goal # 2 Pt will follow two step commands with >75% accuracy and mod clinician cues   Short Term Goal # 3 Pt will identify pictures in field of 6 with >75% accuracy and min clinician cues   Short Term Goal # 4 Pt will consume an SB6/MT2 diet with no overt s/sx of aspiration

## 2020-09-09 NOTE — PROGRESS NOTES
Report received from Oxana CELESTE. Pt laying in bed with son at bedside at the time of report. Plan of care assumed. Safety precautions in place and call light within reach.

## 2020-09-09 NOTE — CARE PLAN
Problem: Communication  Goal: The ability to communicate needs accurately and effectively will improve  Outcome: PROGRESSING SLOWER THAN EXPECTED  Pt required call light reinforcement to alert staff to his needs.    Problem: Safety  Goal: Will remain free from injury  Outcome: PROGRESSING AS EXPECTED  Goal: Will remain free from falls  Outcome: PROGRESSING AS EXPECTED   Safety precautions in place.

## 2020-09-09 NOTE — PROGRESS NOTES
Bedside report given to ROULA Vela. POC discussed, all questions answered. Safety precautions in place. Care released.

## 2020-09-09 NOTE — DISCHARGE PLANNING
Per MRI 1.  Moderate area of acute ischemia in the LEFT frontal lobe  Tiny focus of acute ischemia in the LEFT parietal cortex  Tiny focus of acute ischemia in the RIGHT frontal cortex Mobility and self care scores 24/24. Will follow for therapy evaluations as medically appropriate.

## 2020-09-09 NOTE — PROGRESS NOTES
Zoom meeting set up with pt's daughter, Lisbet. Pt able to hold iPad. Tearful. Emotional support provided to pt and family.

## 2020-09-09 NOTE — CARE PLAN
Problem: Communication  Goal: The ability to communicate needs accurately and effectively will improve  Outcome: PROGRESSING AS EXPECTED  Intervention: [place] patient and significant other/support system to call light to alert staff of needs  Flowsheets (Taken 9/8/2020 1283)  Oriented to:: All of the Following : Location of Bathroom, Visiting Policy, Unit Routine, Call Light and Bedside Controls, Bedside Rail Policy, Smoking Policy, Rights and Responsibilities, Bedside Report, and Patient Education Notebook  Note: Aphasic. Able to answer yes and no questions by nodding/shaking head. Neuro checks unchanged from prior exam. Plan for MRI this AM. Awaiting Speech eval, PT/OT. Will continue to monitor.      Problem: Safety  Goal: Will remain free from falls  Outcome: PROGRESSING AS EXPECTED  Intervention: Implement fall precautions  Flowsheets  Taken 9/9/2020 0800  Environmental Precautions:   Treaded Slipper Socks on Patient   Personal Belongings, Wastebasket, Call Bell etc. in Easy Reach   Transferred to Stronger Side   Report Given to Other Health Care Providers Regarding Fall Risk   Bed in Low Position   Communication Sign for Patients & Families   Mobility Assessed & Appropriate Sign Placed  Taken 9/9/2020 0757  Bed Alarm: Yes - Alarm On  Note: Bed in lowest position, wheels locked. Call bell within reach, calls appropriately for assistance. Fall prevention education provided, verbalized understanding. Hourly rounding. Fall/safety precautions in place. Pt remains free from fall/injury at this time.

## 2020-09-09 NOTE — THERAPY
Physical Therapy   Initial Evaluation     Patient Name: Ze Wan  Age:  65 y.o., Sex:  male  Medical Record #: [id number]  Today's Date: 9/9/2020     Precautions: Other (See Comments)    Assessment  Patient is 65 y.o. male with a diagnosis subacute L frontotemporal infarct. Pt primarily demonstrates with speech and communication deficits. During neurological testing pt did not demonstrate with any balance, coordination, proprioception, strength, or gait deficits. Pt was able to complete SLS, romberg testing, and  objects from floor with no jennifer LOB. Pt was able to go up/down 2 steps with no adverse events. Pt was able to ambulate independently with no jennifer LOB and no AD use. Pt is in no acute skilled PT needs at this time, anticipate pt to d/c home with no therapy needs. Will defer to speech therapy recs as pt is primarily demonstrating difficulty with communication and speech.     Plan    Recommend Physical Therapy for Evaluation only     DC Equipment Recommendations: None  Discharge Recommendations: Anticipate that the patient will have no further physical therapy needs after discharge from the hospital     Objective       09/09/20 0940   Prior Living Situation   Prior Services None   Housing / Facility 1 Story House   Steps Into Home 1   Steps In Home 0   Bathroom Set up Bathtub / Shower Combination   Equipment Owned None   Lives with - Patient's Self Care Capacity Alone and Able to Care For Self   Comments able to get above information from son on telephone; per son him and his wife will be available 24/7 and can take pt to [facility] home if needed; family works from home   Prior Level of Functional Mobility   Bed Mobility Independent   Transfer Status Independent   Ambulation Independent   Distance Ambulation (Feet)   (community )   Assistive Devices Used None   Stairs Independent   Comments per son pt was I with all mobility    Gait Analysis   Gait Level Of Assist Independent   Assistive Device  None   Distance (Feet) 200   # of Times Distance was Traveled 1   Deviation No deviation   # of Stairs Climbed 2   Level of Assist with Stairs Independent   Weight Bearing Status fwb   Bed Mobility    Supine to Sit Independent   Sit to Supine Independent   Scooting Independent   Rolling Independent   Functional Mobility   Sit to Stand Independent   Bed, Chair, Wheelchair Transfer Independent

## 2020-09-09 NOTE — THERAPY
"Speech Language Pathology   Initial Assessment     Patient Name: Ze Wan  AGE:  65 y.o., SEX:  male  Medical Record #: [id number]  Today's Date: 9/9/2020          Assessment    64 y/o admitted on 9/8 for ALOC and stroke. MRI of brain on 9/9 found \"Moderate area of acute ischemia in the LEFT frontal lobe, Tiny focus of acute ischemia in the LEFT parietal cortex, Tiny focus of acute ischemia in the RIGHT frontal cortex, No hemorrhage.\" Not seen by SLP before.    Pt seen on this date for an aphasia evaluation. The Western Aphasia Battery - Bedside Form was administered in full and pt presented with Broca's aphasia as evidenced by profound expressive language deficits and moderate-severe receptive language deficits. Pt unable to produce any verbalizations during the session and when attempting to speak would purse lips and blow out air. He intermittently followed simple 1-step commands, but unable to follow two and three step directions. He identified black and white pictures with 0% accuracy, shapes with 50% accuracy, letters with 50% accuracy, numbers with 33% accuracy, and colors with 100% accuracy. He completed sentence completion tasks with 33% accuracy and followed 1-step written directions with 100% accuracy and 2-step written directions with 0% accuracy. He was able to write his first name, Nazario, and numbers of his address, [id number], but unable to write last name or rest of address. Pt with insight into language deficits and became tearful at end of session. Pt would benefit from aphasia therapy in the acute care setting and at next level of care.     Of note, RN expressing concerns regarding swallowing and clinical swallow order was obtained by MD. SLP to follow for swallow evaluation.    Plan    Recommend Speech Therapy 5 times per week until therapy goals are met for the following treatments:  Comprehension Training, Expression Training, Reading Training, Writing Training and Patient / Family / " Caregiver Education.    Discharge Recommendations: (P) Recommend post-acute placement for additional speech therapy services prior to discharge home       Objective       09/09/20 1023   Vitals   O2 (LPM) 0   O2 Delivery Device None - Room Air   Pain 0 - 10 Group   Therapist Pain Assessment Post Activity Pain Same as Prior to Activity;Nurse Notified;0   Prior Living Situation   Lives with - Patient's Self Care Capacity Alone and Able to Care For Self   Prior Level Of Function   Communication Within Functional Limits   Swallow Within Functional Limits   Dentition Intact   Hearing Within Functional Limits for Evaluation   Vision Within Functional Limits for Evaluation   Patient's Primary Language English   Occupation (Pre-Hospital Vocational) Retired Due To Age   Verbal Expression   Verbal Expression / Aphasia Eval (WDL) X   Verbal Output Automatic Profound (1)   Verbal Output Conversation Profound (1)   Verbal Output Functional Profound (1)   Repetition: Single Words Profound (1)   Naming Profound (1)   Auditory Comprehension   Auditory Comprehension (WDL) X   Yes / No Questions: Personal Information Supervision (5)   Yes / No Questions: General Information Minimal (4)   Yes / No Questions: Abstract Severe (2)   Identifies Pictures Severe (2)   Identifies Body Parts Moderate (3)   Follows One Unit Commands Minimal (4)   Follows Two Unit Commands Moderate (3)   Follows Three Unit Commands Severe (2)   Reading Comprehension   Reading Comprehension (WDL) X   Reading Sentences Moderate (3)   Following Written Direction Moderate (3)   Functional Reading Materials Severe (2)   Written Expression   Written Expression (WDL) X   Functional Writing: Name Supervision (5)   Formulates: Sentence Severe (2)   Overall Legibility Moderate (3)   Aphasia Compensatory Strategies   Compensatory Strategies Used To Communicate Yes / No Responses   Outcome Measures   Outcome Measures Utilized WAB-Bedside   WAB - Bedside (Western Aphasia  Battery)   Spontaneous Speech: Content  0   Spontaneous Speech: Fluency 0   Auditory Comprehension 6   Sequential Commands 2   Repetition Score  0   Object Naming 0   Reading 1   Writing 1   Apraxia  4   Bedside Asphasia Score 13.33   Bedside Language Score 12.5   Short Term Goals   Short Term Goal # 1 Pt will follow simple 1 step commands with >80% accuracy and min clinician cueing   Short Term Goal # 2 Pt will follow two step commands with >75% accuracy and mod clinician cues   Short Term Goal # 3 Pt will identify pictures in field of 6 with >75% accuracy and min clinician cues

## 2020-09-09 NOTE — PROGRESS NOTES
Telemetry Shift Summary    RHYTHM:SR  HR RANGE:66-95  ECTOPY:O COUP, F PVC, R TRIG, R BIG  MEASUREMENTS:0.16/0.10/0.44    Normal Measurements  Rhythm SR  HR Range:   Measurements: 0.12-0.20/0.06-0.10/0.30-0.52    Per CYNDI Andino    Tele strips reviewed and placed in chart.

## 2020-09-09 NOTE — PROGRESS NOTES
[facility] Medicine Daily Progress Note    Date of Service  9/9/2020    Chief Complaint  65 y.o. male admitted 9/8/2020 with inability to speak.    Hospital Course    Patient woke up with inability to speak and drove 12 hours from [place] to his son's home here in [place] and then brought to the ED thereafter.  CT showed stroke and this was confirmed with MRI showing a moderate left frontal lobe infarct and tiny foci of ischemia in both left parietal cortex and right fontal cortex.  He's been started on ASA and statin therapy.  Acute stroke was discussed with neurology upon admission from the ED and therapy and they were in agreement with current intervention and therapies and patient didn't require transfer to the main campus.         Interval Problem Update  Patient feeling okay but tearful with his current situation.  He needs continued speech therapy and cognitive interventions.  He has expressive aphasia and is also showing some receptive aphasia.  I spoke with his daughter via phone who is very concerned and she said she'd relay info to her brother.      Consultants/Specialty  None  Admitting physician discussed case with neuro - no consult.    Code Status  Full Code    Disposition  Rehab vs SNF.    Review of Systems  Review of Systems   Constitutional: Negative for chills and fever.   HENT: Negative for congestion.    Eyes: Negative for blurred vision and photophobia.   Respiratory: Negative for cough and shortness of breath.    Cardiovascular: Negative for chest pain, claudication and leg swelling.   Gastrointestinal: Negative for abdominal pain, constipation, diarrhea, heartburn, nausea and vomiting.   Genitourinary: Negative for dysuria and hematuria.   Musculoskeletal: Negative for joint pain and myalgias.   Skin: Negative for itching and rash.   Neurological: Positive for speech change (unable to speak ). Negative for dizziness, sensory change and headaches.   Psychiatric/Behavioral: Negative for  depression. The patient is not nervous/anxious and does not have insomnia.         Physical Exam  Temp:  [36.7 °C (98.1 °F)-37.3 °C (99.2 °F)] 37.2 °C (98.9 °F)  Pulse:  [34-95] 90  Resp:  [16-19] 16  BP: (119-170)/() 119/89  SpO2:  [90 %-98 %] 94 %    Physical Exam  Vitals signs and nursing note reviewed.   Constitutional:       General: He is not in acute distress.     Appearance: Normal appearance.   HENT:      Head: Normocephalic and atraumatic.   Eyes:      General: No scleral icterus.     Extraocular Movements: Extraocular movements intact.   Neck:      Musculoskeletal: Normal range of motion and neck supple.   Cardiovascular:      Rate and Rhythm: Normal rate and regular rhythm.      Pulses: Normal pulses.      Heart sounds: Normal heart sounds. No murmur.   Pulmonary:      Effort: Pulmonary effort is normal. No respiratory distress.      Breath sounds: Normal breath sounds. No wheezing, rhonchi or rales.   Abdominal:      General: Abdomen is flat. Bowel sounds are normal. There is no distension.      Palpations: Abdomen is soft.      Tenderness: There is no rebound.   Musculoskeletal:         General: No swelling or tenderness.   Lymphadenopathy:      Cervical: No cervical adenopathy.   Skin:     Coloration: Skin is not jaundiced.      Findings: No erythema.   Neurological:      Mental Status: He is alert and oriented to person, place, and time.      Cranial Nerves: No cranial nerve deficit.      Comments: Expressive aphasia, able to follow commands and answer yes/no with head gesture.     Psychiatric:         Mood and Affect: Mood normal.         Behavior: Behavior normal.         Fluids    Intake/Output Summary (Last 24 hours) at 9/9/2020 1336  Last data filed at 9/8/2020 1732  Gross per 24 hour   Intake 34.58 ml   Output --   Net 34.58 ml       Laboratory  Recent Labs     09/08/20  1148 09/09/20  0432   WBC 12.3* 10.6   RBC 6.11* 5.47   HEMOGLOBIN 17.4 15.7   HEMATOCRIT 51.4 46.9   MCV 84.1 85.7    MCH 28.5 28.7   MCHC 33.9 33.5*   RDW 39.9 41.4   PLATELETCT 290 231   MPV 10.9 11.2     Recent Labs     09/08/20  1148   SODIUM 139   POTASSIUM 4.4   CHLORIDE 101   CO2 22   GLUCOSE 121*   BUN 19   CREATININE 1.20   CALCIUM 9.8     Recent Labs     09/08/20  1148   APTT 24.9   INR 0.98         Recent Labs     09/09/20  0432   TRIGLYCERIDE 160*   HDL 36*   *       Imaging  MR-BRAIN-W/O   Final Result      1.  Moderate area of acute ischemia in the LEFT frontal lobe   2.  Tiny focus of acute ischemia in the LEFT parietal cortex   3.  Tiny focus of acute ischemia in the RIGHT frontal cortex   4.  No hemorrhage   5.  Atrophy   6.  White matter changes   7.  Chronic LEFT maxillary sinus disease      EC-ECHOCARDIOGRAM COMPLETE W/ CONT   Final Result      DX-CHEST-PORTABLE (1 VIEW)   Final Result      1.  Cardiomegaly.      2.  Mild bilateral perihilar interstitial opacities.      CT-CTA NECK WITH & W/O-POST PROCESSING   Final Result      1.  Mild bilateral internal carotid artery atherosclerotic plaque without significant stenosis      2.  Left vertebral artery dissection which is probably acute and less likely subacute      3.  Of note is a left vertebral artery dissection does not correspond anatomically to the area of infarct on the cerebral perfusion scan      4.  MRI brain with and without contrast recommended for further assessment to assess for posterior circulation infarct      Findings were discussed with AGUSTO TODD on 9/8/2020 12:45 PM.      CT-CTA HEAD WITH & W/O-POST PROCESS   Final Result      CT angiogram of the [place] within normal limits.      CT-CEREBRAL PERFUSION ANALYSIS   Final Result      1.  Cerebral blood flow less than 30% likely representing completed infarct = 0 mL.      2.  T Max more than 6 seconds likely representing combination of completed infarct and ischemia = 45 mL.      3.  Mismatched volume likely representing ischemic brain/penumbra = incident      4.  Please  note that the cerebral perfusion was performed on the limited brain tissue around the basal ganglia region. Infarct/ischemia outside the CT perfusion sections can be missed in this study.      CT-HEAD W/O   Final Result   Addendum 1 of 1   The left frontotemporal infarct is most likely subacute and not old      Final           Assessment/Plan  * Acute CVA (cerebrovascular accident) (HCC)  Assessment & Plan  Expressive aphasia is his major deficit.   He will placed on aspirin and statin.    We will start permissive hypertension.    PT/OT/SLP  He may need to go to a rehab facility.    MRI confirms stroke : Moderate area of acute ischemia in the LEFT frontal lobe, Tiny focus of acute ischemia in the LEFT parietal cortex, Tiny focus of acute ischemia in the RIGHT frontal cortex  Echo shows EF 35%  Tele without evidence of atrial fibrillation.    Expressive aphasia  Assessment & Plan  Patient at this point has expressive aphasia.  Patient evaluated by speech therapy  Patient will need most likely ongoing speech therapy to have him allow recovery from the stroke  Continue with aspirin and statin  Left frontotemporal CVA noted    History of pulmonary embolism  Assessment & Plan  Patient is a history of pulmonary embolism.  He was on oral anticoagulation for about a year    Obesity  Assessment & Plan  BMI 32.4      Hyperglycemia  Assessment & Plan  Hemoglobin A1c is 5.7    Leukocytosis  Assessment & Plan  White blood cell count is 12.3.  Patient at this point will be monitored on his white blood cell count most likely a reactive leukocytosis with a stroke.  UA without evidence of infection.           VTE prophylaxis: SCDs

## 2020-09-09 NOTE — PROGRESS NOTES
Spoke with daughter, Lisbet, who lives out of state and can be reached at [phone]. Plan of care discussed. All questions concerning next phase of care answered and provided education for. Pt's daughter stated that pt was feeling numbness on the right side of his body and reported anxiety starting on 8/27. Pt's daughter stated that he was reporting these symptoms only to a friend during the initial onset and refused to seek medical attention. Pt drove from [place], [place] to [place], [place] to his son's house unanounced and unable to speak. Pt's son then brought him to the ER.

## 2020-09-10 ENCOUNTER — APPOINTMENT (OUTPATIENT)
Dept: RADIOLOGY | Facility: MEDICAL CENTER | Age: 65
DRG: 066 | End: 2020-09-10
Attending: INTERNAL MEDICINE
Payer: MEDICARE

## 2020-09-10 ENCOUNTER — PATIENT OUTREACH (OUTPATIENT)
Dept: HEALTH INFORMATION MANAGEMENT | Facility: OTHER | Age: 65
End: 2020-09-10

## 2020-09-10 VITALS
BODY MASS INDEX: 33.19 KG/M2 | TEMPERATURE: 97.8 F | DIASTOLIC BLOOD PRESSURE: 45 MMHG | OXYGEN SATURATION: 97 % | SYSTOLIC BLOOD PRESSURE: 142 MMHG | HEART RATE: 77 BPM | HEIGHT: 73 IN | RESPIRATION RATE: 16 BRPM | WEIGHT: 250.44 LBS

## 2020-09-10 LAB
ANION GAP SERPL CALC-SCNC: 14 MMOL/L (ref 7–16)
BUN SERPL-MCNC: 17 MG/DL (ref 8–22)
CALCIUM SERPL-MCNC: 9.1 MG/DL (ref 8.4–10.2)
CHLORIDE SERPL-SCNC: 101 MMOL/L (ref 96–112)
CO2 SERPL-SCNC: 24 MMOL/L (ref 20–33)
CREAT SERPL-MCNC: 0.96 MG/DL (ref 0.5–1.4)
ERYTHROCYTE [DISTWIDTH] IN BLOOD BY AUTOMATED COUNT: 41 FL (ref 35.9–50)
GLUCOSE SERPL-MCNC: 107 MG/DL (ref 65–99)
HCT VFR BLD AUTO: 47.2 % (ref 42–52)
HGB BLD-MCNC: 15.5 G/DL (ref 14–18)
MCH RBC QN AUTO: 28.2 PG (ref 27–33)
MCHC RBC AUTO-ENTMCNC: 32.8 G/DL (ref 33.7–35.3)
MCV RBC AUTO: 85.8 FL (ref 81.4–97.8)
PLATELET # BLD AUTO: 218 K/UL (ref 164–446)
PMV BLD AUTO: 11.1 FL (ref 9–12.9)
POTASSIUM SERPL-SCNC: 3.9 MMOL/L (ref 3.6–5.5)
RBC # BLD AUTO: 5.5 M/UL (ref 4.7–6.1)
SODIUM SERPL-SCNC: 139 MMOL/L (ref 135–145)
WBC # BLD AUTO: 8.8 K/UL (ref 4.8–10.8)

## 2020-09-10 PROCEDURE — 99239 HOSP IP/OBS DSCHRG MGMT >30: CPT | Performed by: INTERNAL MEDICINE

## 2020-09-10 PROCEDURE — 80048 BASIC METABOLIC PNL TOTAL CA: CPT

## 2020-09-10 PROCEDURE — 85027 COMPLETE CBC AUTOMATED: CPT

## 2020-09-10 PROCEDURE — 700102 HCHG RX REV CODE 250 W/ 637 OVERRIDE(OP): Performed by: HOSPITALIST

## 2020-09-10 PROCEDURE — 92611 MOTION FLUOROSCOPY/SWALLOW: CPT

## 2020-09-10 PROCEDURE — 700102 HCHG RX REV CODE 250 W/ 637 OVERRIDE(OP): Performed by: INTERNAL MEDICINE

## 2020-09-10 PROCEDURE — 74230 X-RAY XM SWLNG FUNCJ C+: CPT

## 2020-09-10 PROCEDURE — 92526 ORAL FUNCTION THERAPY: CPT

## 2020-09-10 PROCEDURE — A9270 NON-COVERED ITEM OR SERVICE: HCPCS | Performed by: INTERNAL MEDICINE

## 2020-09-10 PROCEDURE — A9270 NON-COVERED ITEM OR SERVICE: HCPCS | Performed by: HOSPITALIST

## 2020-09-10 RX ORDER — ATORVASTATIN CALCIUM 80 MG/1
80 TABLET, FILM COATED ORAL EVERY EVENING
Qty: 30 TAB | Refills: 1 | Status: SHIPPED | OUTPATIENT
Start: 2020-09-10 | End: 2020-09-18 | Stop reason: SDUPTHER

## 2020-09-10 RX ORDER — CARVEDILOL 3.12 MG/1
3.12 TABLET ORAL 2 TIMES DAILY WITH MEALS
Qty: 60 TAB | Refills: 2 | Status: SHIPPED | OUTPATIENT
Start: 2020-09-10 | End: 2020-09-18 | Stop reason: SDUPTHER

## 2020-09-10 RX ORDER — LISINOPRIL 5 MG/1
5 TABLET ORAL
Status: DISCONTINUED | OUTPATIENT
Start: 2020-09-10 | End: 2020-09-10 | Stop reason: HOSPADM

## 2020-09-10 RX ORDER — CARVEDILOL 3.12 MG/1
3.12 TABLET ORAL 2 TIMES DAILY WITH MEALS
Status: DISCONTINUED | OUTPATIENT
Start: 2020-09-10 | End: 2020-09-10 | Stop reason: HOSPADM

## 2020-09-10 RX ORDER — ASPIRIN 325 MG
325 TABLET ORAL DAILY
Qty: 100 TAB | COMMUNITY
Start: 2020-09-11 | End: 2020-11-16

## 2020-09-10 RX ORDER — LISINOPRIL 5 MG/1
5 TABLET ORAL DAILY
Qty: 30 TAB | Refills: 2 | Status: SHIPPED | OUTPATIENT
Start: 2020-09-11 | End: 2020-09-18 | Stop reason: SDUPTHER

## 2020-09-10 RX ADMIN — ASPIRIN 325 MG ORAL TABLET 325 MG: 325 PILL ORAL at 05:19

## 2020-09-10 RX ADMIN — CARVEDILOL 3.12 MG: 3.12 TABLET, FILM COATED ORAL at 09:35

## 2020-09-10 RX ADMIN — LISINOPRIL 5 MG: 5 TABLET ORAL at 09:35

## 2020-09-10 ASSESSMENT — FIBROSIS 4 INDEX: FIB4 SCORE: 1.26

## 2020-09-10 NOTE — FACE TO FACE
Face to Face Supporting Documentation - Home Health    The encounter with this patient was in whole or in part the primary reason for home health admission.    Date of encounter:   Patient:                    MRN:                       YOB: 2021  Ze Wan  [id number]  1955     Home health to see patient for:  Skilled Nursing care for assessment, interventions & education, Physical Therapy evaluation and treatment and Speech Language Pathology evaluation and treatment    Skilled need for:  New Onset Medical Diagnosis acute stroke with expressive and receptive aphasia    Skilled nursing interventions to include:  Comment: .    Homebound status evidenced by:  Needs the assistance of another person in order to leave the home. Leaving home requires a considerable and taxing effort. There is a normal inability to leave the home.    Community Physician to provide follow up care: No primary care provider on file.     Optional Interventions? No      I certify the face to face encounter for this home health care referral meets the CMS requirements and the encounter/clinical assessment with the patient was, in whole, or in part, for the medical condition(s) listed above, which is the primary reason for home health care. Based on my clinical findings: the service(s) are medically necessary, support the need for home health care, and the homebound criteria are met.  I certify that this patient has had a face to face encounter by myself.  Eva Sawant D.O. - NPI: [id number]

## 2020-09-10 NOTE — THERAPY
Speech Language Pathology  Daily Treatment     Patient Name: Ze Wan  Age:  65 y.o., Sex:  male  Medical Record #: [id number]  Today's Date: 9/10/2020     Precautions  Precautions: (P) Swallow Precautions ( See Comments)  Comments: (P) expressive aphasia    Assessment    Pt seen this date with soft and bite sized/mildly thick liquid breakfast tray. PO trials of thins assessed in additional to mealtray. Hyolaryngeal elevation palpated as complete, timely initiation of swallow appreciated. Pt unable to vocalize or elicit a volitional cough. Pt demonstrated functional mastication with mild-mod right side lingual and oral residue appreciated. Pt demonstrated limited awareness of residue and required cues to utilize liquid wash to clear residue. Pt required min verbal cues to slow rate of PO intake. Subtle throat clear and delayed cough x1 appreciated with single sips of thins. Immediate cough appreciated with consecutive sips of thins, which is concerning for aspiration. Jug of thins removed from pt's room.     Recommend continuation of SB6/MT2 with adherence to the following: monitoring during meals, finger sweep right cheek for oral residue, alternate liquids and solids, slow rate, small bites, no straws, upright at 90* for PO. Patient would benefit from a MBSS to confirm/rule out aspiration and determine appropriateness for upgrade to thin liquids. SLP following.     Plan    Recommend continuation of SB6/MT2 with implementation of swallow strategies. Patient would benefit from a MBSS to confirm/rule out aspiration and determine appropriateness for upgrade to thin liquids.    Continue current treatment plan.    Discharge Recommendations: (P) Recommend post-acute placement for additional speech therapy services prior to discharge home    Subjective    Pt awake, alert, followed simple directions and was nonverbal throughout session.      Objective       09/10/20 0820   Dysphagia    Dysphagia X   Positioning /  Behavior Modification Self Monitoring;Modulate Rate or Bite Size;Other (see Comments);Alternate Solids and Liquids  (right finger sweep)   Other Treatments SB6/MT2 breakfast tray and PO trials thins   Diet / Liquid Recommendation Soft & Bite-Sized (6) - (Dysphagia III);Mildly Thick (2) - (Nectar Thick)   Nutritional Liquid Intake Rating Scale Thickened beverages (mildly thick unless otherwise specified)   Nutritional Food Intake Rating Scale Total oral diet with multiple consistencies but requiring special preparation or compensations   Nursing Communication Swallow Precaution Sign Posted at Head of Bed   Skilled Intervention Compensatory Strategies;Verbal Cueing   Recommended Route of Medication Administration   Medication Administration  Float Whole with Puree   Patient / Family Goals   Patient / Family Goal #1 unable to state   Goal #1 Outcome Goal not met   Short Term Goals   Short Term Goal # 4 Pt will consume an SB6/MT2 diet with no overt s/sx of aspiration   Goal Outcome  # 4 Progressing as expected

## 2020-09-10 NOTE — PROGRESS NOTES
Telemetry Shift Summary    RHYTHM:SR  HR RANGE:64-95  ECTOPY:F PVC, R TRIG, R COUP, 5 BEATS OF VTACH  MEASUREMENTS:0.16/0.08/0.40    Normal Measurements  Rhythm SR  HR Range:   Measurements: 0.12-0.20/0.06-0.10/0.30-0.52    Per CYNDI Bates    Tele strips reviewed and placed in chart.

## 2020-09-10 NOTE — DISCHARGE PLANNING
Anticipated Discharge Disposition: [facility]    Action: LSW spoke with pts son who stated that pt will be staying with him temporarily at [address] [facility] in [place] [address]. LSW explained that pt has an order for HHC and that she would like to try to get pt set up with services but it might be difficult without a PCP. Son concerned about getting pt set up with a PCP but is unsure if pt will stay in [place]. LSW stated she would keep him updated.    Barriers to Discharge: [facility] acceptance     Plan: LSW to f/u

## 2020-09-10 NOTE — DISCHARGE SUMMARY
Discharge Summary    CHIEF COMPLAINT ON ADMISSION  Chief Complaint   Patient presents with   • ALOC   • Possible Stroke       Reason for Admission  Difficulty talking/walking;Shortne*     Admission Date  9/8/2020    CODE STATUS  Full Code    HPI & HOSPITAL COURSE  This is a 65 y.o. male here with difficulty speaking.  He woke up with inability to speak and drove 12 hours from [place] to his son's home here in [place] and then brought to the ED thereafter.  CT showed stroke and this was confirmed with MRI showing a moderate left frontal lobe infarct and tiny foci of ischemia in both left parietal cortex and right fontal cortex.  He's been started on ASA and statin therapy.  Acute stroke was discussed with neurology upon admission from the ED and therapy and they were in agreement with current intervention and therapies and patient didn't require transfer to the main campus.   Patient has worked with PT and no further needs identified. He does need continued speech therapy and occupational therapy and getting home health set up for this.  Aldactone not started at this time as pressure could not yet tolerate, likely to start at heart failure cardiology follow up.    Therefore, he is discharged in good and stable condition to home with organized home healthcare and close outpatient follow-up.    The patient met 2-midnight criteria for an inpatient stay at the time of discharge.    Discharge Date  9/10/2020    FOLLOW UP ITEMS POST DISCHARGE  Establish with local PCP  Cardiology and stroke bridge follow up appointments have been scheduled.      DISCHARGE DIAGNOSES  Principal Problem:    Acute CVA (cerebrovascular accident) (HCC) POA: Unknown  Active Problems:    Expressive aphasia POA: Unknown    Leukocytosis POA: Unknown    Hyperglycemia POA: Unknown    Obesity POA: Unknown    History of pulmonary embolism POA: Unknown  Resolved Problems:    * No resolved hospital problems. *      FOLLOW UP  Future Appointments   Date  Time Provider Department Center   9/18/2020  9:20 AM Cristo Esparza M.D. [facility] None   9/28/2020  9:00 AM [facility] None     [facility]  [address], [place] [address]  [phone]     Please call to establish with a Primary Care Physician and schedule a hospital follow up . Thank you       MEDICATIONS ON DISCHARGE     Medication List      START taking these medications      Instructions   aspirin 325 MG Tabs  Start taking on: September 11, 2020  Commonly known as: ASA   Take 1 Tab by mouth every day.  Dose: 325 mg     atorvastatin 80 MG tablet  Commonly known as: LIPITOR   Take 1 Tab by mouth every evening.  Dose: 80 mg     carvedilol 3.125 MG Tabs  Commonly known as: COREG   Take 1 Tab by mouth 2 times a day, with meals.  Dose: 3.125 mg     lisinopril 5 MG Tabs  Start taking on: September 11, 2020  Commonly known as: PRINIVIL   Take 1 Tab by mouth every day.  Dose: 5 mg            Allergies  Allergies   Allergen Reactions   • Shellfish Allergy Hives       DIET  Orders Placed This Encounter   Procedures   • Diet Order Cardiac (Float pills whole in puree, no straws, up to a chair for meals)     Standing Status:   Standing     Number of Occurrences:   1     Order Specific Question:   Diet:     Answer:   Cardiac [6]     Comments:   Float pills whole in puree, no straws, up to a chair for meals     Order Specific Question:   Texture Modifier     Answer:   Level 6 - Soft & Bite Sized (Dysphagia 3)     Order Specific Question:   Liquid level     Answer:   Level 0 - Thin     Order Specific Question:   Miscellaneous modifications:     Answer:   SLP - Comments [24]     Order Specific Question:   Miscellaneous modifications:     Answer:   SLP - Deliver to Nursing Station [22]       ACTIVITY  As tolerated.  Weight bearing as tolerated    CONSULTATIONS  None, discussed via phone with neurology    PROCEDURES  none    LABORATORY  Lab Results   Component Value Date    SODIUM 139 09/10/2020    POTASSIUM 3.9  09/10/2020    CHLORIDE 101 09/10/2020    CO2 24 09/10/2020    GLUCOSE 107 (H) 09/10/2020    BUN 17 09/10/2020    CREATININE 0.96 09/10/2020        Lab Results   Component Value Date    WBC 8.8 09/10/2020    HEMOGLOBIN 15.5 09/10/2020    HEMATOCRIT 47.2 09/10/2020    PLATELETCT 218 09/10/2020        Total time of the discharge process exceeds 35 minutes.

## 2020-09-10 NOTE — DISCHARGE PLANNING
Follow up chart review anticipate home with outpatient follow up when medically cleared. Limited therapy need for IRF level of care. May benefit from outpatient physiatry follow up with Dr. Harvey ext [phone]. No physiatry consult ordered per protocol.

## 2020-09-10 NOTE — PROGRESS NOTES
Telemetry Shift Summary    Rhythm SR/ST  HR Range 70s-104  Ectopy F-PVC/PAC, O-coup/tri/bi  Measurements 0.14/0.10/0.40        Normal Values  Rhythm SR  HR Range    Measurements 0.12-0.20 / 0.06-0.10  / 0.30-0.52

## 2020-09-10 NOTE — PROGRESS NOTES
Report received from Mirian, neuro assessment and NIHSS performed together with no changes; pt not able to communicate name and date, right facial droop.  Assessment completed after Mirian left.  Pt able to nod yes or no for questions.  Per the pt, he is planning on staying with his son locally.    Speech at bedside during breakfast and evaluated him again; no changes in his diet.  Daughter called about 0835 and spent over 20 minutes on the phone answering questions; she was asking if there is a way to have the doctor reach out to her.    Dr. Sawant rounded and plan on discharging him home with home health; reached out to social work and they are waiting for acceptance but pt does not have pcp locally.  Speech still on the floor at this time and let her know that the plan was to discharge home and asked for his precautions to put them in his paperwork she in turned reached out to her leader and escalated to a barium swallow after speaking with the doctor.  Pt left about 1115 with speech.

## 2020-09-10 NOTE — DISCHARGE INSTRUCTIONS
Speech-Language Therapy After a Stroke  You may have many physical changes after a stroke, and some of them may affect your ability to communicate. You may have problems talking, putting your thoughts into words, or using and understanding words. Speech-language therapy is a common treatment after a stroke.  How are speech and language affected by a stroke?  A stroke can damage your brain and nervous system. In most people, the left side of the brain controls language and speech, so damage to that area can affect those functions. You may have problems with:  · Understanding spoken or written words.  · Sharing your thoughts by talking. You may have trouble:  ? Speaking clearly.  ? Saying what you mean to say.  What is aphasia?  Aphasia is a condition that affects your ability to communicate with others. A stroke often causes aphasia because of damage to the left side of the brain. This is typically the side that controls the ability to talk and understand language. Symptoms of aphasia include:  · Struggling to think of words and names of people, places, and objects.  · Using the wrong words while talking.  · Making up new words (without knowing it) that do not make sense to other people.  · Problems putting words together into a sentence.  · Difficulty understanding others while they talk.  · Problems with listening, reading, writing, using numbers, or doing math.  How can therapy help?  Speech-language therapy is a common treatment during stroke recovery. It may include doing communication activities, exercising your speech muscles, and practicing speech. It may help you:  · Learn to talk and communicate again.  · Have conversations.  · Use the right words to express ideas.  · Put words together into sentences.  · Learn to speak more clearly so others can understand you.  · Use gestures, sign language, symbols, or other methods to express yourself (aided communication). These can be used in place of speech or to add  "to what you are able to say. Some aided communication may involve use of electronic devices.  When will therapy start and where will I have therapy?  Your health care provider will decide when it is best for you to start therapy. Some people start rehabilitation, including speech-language therapy, as soon as they are medically stable. This may be 24-48 hours after a stroke.  Rehabilitation can take place in a few different places, based on your needs. It may take place in:  · The hospital or an in-patient rehabilitation hospital.  · An outpatient rehabilitation facility.  · A long-term care facility.  · A community rehabilitation clinic.  · Your home.  How can my family and friends support my recovery?  Your family and friends can help by:  · Being open about your problems.  · Creating or modifying daily routines to make talking and communicating easier.  · Lowering background noise.  · Being patient when you are trying to express your thoughts or understand other people.  · Getting your attention before talking to you.  · Using short and simple sentences and giving you extra time to talk or to respond to questions.  · Talking to you in a normal voice.  · Keeping eye contact with you during conversation.  · Paying attention to your body language.  · Using pictures, written words, or symbols to help you understand.  · Helping you to use aided communication.  · Asking \"yes\" or \"no\" questions.  · Praising your speech and progress.  Summary  · Since a stroke results from damage to your brain and nervous system, it can affect your speech and ability to use and understand language  · Aphasia is a condition that affects your ability to communicate with others. A stroke often causes aphasia because of damage to the left side of the brain.  · Speech-language therapy is a common treatment after a stroke.  · Your family and friends can help by being open about your problems, giving you time to form words and sentences, and " "speaking in short, simple sentences.  This information is not intended to replace advice given to you by your health care provider. Make sure you discuss any questions you have with your health care provider.  Document Released: 04/06/2018 Document Revised: 11/30/2018 Document Reviewed: 04/06/2018  [employer] Patient Education © 2020 [employer]  Discharge Instructions    Discharged to home by car with relative. Discharged via wheelchair, hospital escort: Yes.  Special equipment needed: Not Applicable    Be sure to schedule a follow-up appointment with your primary care doctor or any specialists as instructed.     Discharge Plan:        I understand that a diet low in cholesterol, fat, and sodium is recommended for good health. Unless I have been given specific instructions below for another diet, I accept this instruction as my diet prescription.   Other diet: 2 Gram Low Sodium Diet  A 2 gram sodium diet restricts the amount of sodium in the diet to no more than 2 g or 2000 mg daily. Limiting the amount of sodium is often used to help lower blood pressure. It is important if you have heart, liver, or kidney problems. Many foods contain sodium for flavor and sometimes as a preservative. When the amount of sodium in a diet needs to be low, it is important to know what to look for when choosing foods and drinks. The following includes some information and guidelines to help make it easier for you to adapt to a low sodium diet.  QUICK TIPS  · Do not add salt to food.  · Avoid convenience items and fast food.  · Choose unsalted snack foods.  · Buy lower sodium products, often labeled as \"lower sodium\" or \"no salt added.\"  · Check food labels to learn how much sodium is in 1 serving.  · When eating at a restaurant, ask that your food be prepared with less salt or none, if possible.  READING FOOD LABELS FOR SODIUM INFORMATION  The nutrition facts label is a good place to find how much sodium is in foods. Look for products " with no more than 500 to 600 mg of sodium per meal and no more than 150 mg per serving.  Remember that 2 g = 2000 mg.  The food label may also list foods as:  · Sodium-free: Less than 5 mg in a serving.  · Very low sodium: 35 mg or less in a serving.  · Low-sodium: 140 mg or less in a serving.  · Light in sodium: 50% less sodium in a serving. For example, if a food that usually has 300 mg of sodium is changed to become light in sodium, it will have 150 mg of sodium.  · Reduced sodium: 25% less sodium in a serving. For example, if a food that usually has 400 mg of sodium is changed to reduced sodium, it will have 300 mg of sodium.  CHOOSING FOODS  Grains  · Avoid: Salted crackers and snack items. Some cereals, including instant hot cereals. Bread stuffing and biscuit mixes. Seasoned rice or pasta mixes.  · Choose: Unsalted snack items. Low-sodium cereals, oats, puffed wheat and rice, shredded wheat. English muffins and bread. Pasta.  Meats  · Avoid: Salted, canned, smoked, spiced, pickled meats, including fish and poultry. [place], ham, sausage, cold cuts, hot dogs, anchovies.  · Choose: Low-sodium canned tuna and salmon. Fresh or frozen meat, poultry, and fish.  Dairy  · Avoid: Processed cheese and spreads. Cottage cheese. Buttermilk and condensed milk. Regular cheese.  · Choose: Milk. Low-sodium cottage cheese. Yogurt. Sour cream. Low-sodium cheese.  Fruits and Vegetables  · Avoid: Regular canned vegetables. Regular canned tomato sauce and paste. Frozen vegetables in sauces. Olives. Pickles. Relishes. Sauerkraut.  · Choose: Low-sodium canned vegetables. Low-sodium tomato sauce and paste. Frozen or fresh vegetables. Fresh and frozen fruit.  Condiments  · Avoid: Canned and packaged gravies. Worcestershire sauce. Tartar sauce. Barbecue sauce. Soy sauce. Steak sauce. Ketchup. Onion, garlic, and table salt. Meat flavorings and tenderizers.  · Choose: Fresh and dried herbs and spices. Low-sodium varieties of mustard and  ketchup. Lemon juice. Tabasco sauce. Horseradish.  SAMPLE 2 GRAM SODIUM MEAL PLAN  Breakfast / Sodium (mg)  · 1 cup low-fat milk / 143 mg  · 2 slices whole-wheat toast / 270 mg  · 1 tbs heart-healthy margarine / 153 mg  · 1 hard-boiled egg / 139 mg  · 1 small orange / 0 mg  Lunch / Sodium (mg)  · 1 cup raw carrots / 76 mg  · ½ cup hummus / 298 mg  · 1 cup low-fat milk / 143 mg  · ½ cup red grapes / 2 mg  · 1 whole-wheat dillon bread / 356 mg  Dinner / Sodium (mg)  · 1 cup whole-wheat pasta / 2 mg  · 1 cup low-sodium tomato sauce / 73 mg  · 3 oz lean ground beef / 57 mg  · 1 small side salad (1 cup raw spinach leaves, ½ cup cucumber, ¼ cup yellow bell pepper) with 1 tsp olive oil and 1 tsp red wine vinegar / 25 mg  Snack / Sodium (mg)  · 1 container low-fat vanilla yogurt / 107 mg  · 3 maco cracker squares / 127 mg  Nutrient Analysis  · Calories: 2033  · Protein: 77 g  · Carbohydrate: 282 g  · Fat: 72 g  · Sodium: 1971 mg  Document Released: 12/18/2006 Document Revised: 03/11/2013 Document Reviewed: 03/21/2011  [employer]® Patient Information ©2014 [employer].      Special Instructions:     [facility] Patient Discharge Instructions  · Monitor your weight daily, and maintain a weight chart, to track your weight changes.   · Activity as tolerated, unless your Doctor has ordered otherwise. Other activity order: Not applicable.  · Follow a low fat, low cholesterol, low salt diet unless instructed otherwise by your Doctor. Read the labels on the back of food products and track your intake of fat, cholesterol and salt.   · Fluid Restriction No. If a Fluid Restriction has been ordered by your Doctor, measure fluids with a measuring cup to ensure that you are not exceeding the restriction.   · No smoking.  · Oxygen No. If your Doctor has ordered that you wear Oxygen at home, it is important to wear it as ordered.  · Did you receive an explanation from staff on the importance of taking each of your medications and why it is  necessary to stay on the medications the physician/care provider has ordered? Yes  · Do you have any questions concerning how to manage your heart failure and what to do should you have any increased signs and symptoms after you go home? Yes  · Do you feel like your heart failure care team involved you in the care treatment plan and allowed you to make decisions regarding your care while in the hospital and addressed any discharge needs you might have? Yes    See the educational handout provided at discharge for more information on monitoring your daily weight, activity and diet. This also explains more about Heart Failure, symptoms of a flare-up and some of the tests that you have undergone.     Warning Signs of a Flare-Up include:  · Swelling in the ankles or lower legs.  · Shortness of breath, while at rest, or while doing normal activities.   · Shortness of breath at night when in bed, or coughing in bed.   · Requiring more pillows to sleep at night, or needing to sit up at night to sleep.  · Feeling weak, dizzy or fatigued.     When to call your Doctor:  · Call [facility] about questions regarding the discharge instructions you were given [phone].  (Discharge Unit Med-Tele)  · Call your Primary Care Physician or Cardiologist if:   1. You experience any pain radiating to your jaw or neck.  2. You have any difficulty breathing.  3. You experience weight gain of 2 lbs in a day or 5 lbs in a week.   4. You feel any palpitations or irregular heartbeats.  5. You become dizzy or lose consciousness.   If you have had an angiogram or had a pacemaker or AICD placed, and experience:  1. Bleeding, drainage or swelling at the surgical / puncture site.  2. Fever greater than 100.0 F  3. Shock from internal defibrillator.  4. Cool and / or numb extremities.      · Is patient discharged on Warfarin / Coumadin?   No     Depression / Suicide Risk    As you are discharged from [facility]  facility, it is important to learn how to keep safe from harming yourself.    Recognize the warning signs:  · Abrupt changes in personality, positive or negative- including increase in energy   · Giving away possessions  · Change in eating patterns- significant weight changes-  positive or negative  · Change in sleeping patterns- unable to sleep or sleeping all the time   · Unwillingness or inability to communicate  · Depression  · Unusual sadness, discouragement and loneliness  · Talk of wanting to die  · Neglect of personal appearance   · Rebelliousness- reckless behavior  · Withdrawal from people/activities they love  · Confusion- inability to concentrate     If you or a loved one observes any of these behaviors or has concerns about self-harm, here's what you can do:  · Talk about it- your feelings and reasons for harming yourself  · Remove any means that you might use to hurt yourself (examples: pills, rope, extension cords, firearm)  · Get professional help from the community (Mental Health, Substance Abuse, psychological counseling)  · Do not be alone:Call your Safe Contact- someone whom you trust who will be there for you.  · Call your local CRISIS HOTLINE 279-7500 or 014-741-7240  · Call your local Children's Mobile Crisis Response Team [place] (757) 021-5441 or www.[employer]  · Call the toll free National Suicide Prevention Hotlines   · National Suicide Prevention Lifeline 952-470-PHAA (3331)  · National Hope Line Network 800-SUICIDE (702-3337)      Dysphagia Eating Plan, Pureed  This diet is helpful for people with moderate to severe swallowing problems. Pureed foods are smooth and are prepared without lumps so that they can be swallowed safely. Work with your health care provider and your diet and nutrition specialist (dietitian) to make sure that you are following the diet safely and getting all the nutrients you need.  What are tips for following this plan?  General instructions  · You may  eat foods that are soft and have a pudding-like texture.  · Do not eat foods that you have to chew. If you have to chew the food, then you cannot eat it.  · Avoid foods that are hard, dry, sticky, chunky, lumpy, or stringy. Also avoid foods with nuts, seeds, raisins, skins, or pulp.  · You may be instructed to thicken liquids. Follow your health care provider's instructions about how to do this and to what consistency.  Cooking    · If a food is not originally a smooth texture, you may be able to eat the food after:  ? Pureeing it. This can be done with a .  ? Moistening it. This can be done by adding juice, cooking liquid, gravy, or sauce to a dry food and then pureeing it. For example, you may have bread if you soak it in milk and puree it.  · If a food is too thin, you may add a commercial thickener, corn starch, rice cereal, or potato flakes to thicken it.  · Strain and throw away any liquid that separates from a solid pureed food before eating.  · Strain lumps, chunks, pulp, and seeds from pureed foods before eating.  · Reheat foods slowly to prevent a tough crust from forming.  Meal planning  · Eat a variety of foods to get all the nutrients you need.  · Add dry milk or protein powder to food to increase calories and protein content.  · Follow your meal plan as told by your dietitian.  What foods are allowed?  The items listed may not be a complete list. Talk with your dietitian about what dietary choices are best for you.  Grains  Soft breads, pancakes, [place] toast, muffins, and bread stuffing pureed to a smooth, moist texture, without nuts or seeds. Cooked cereals that have a pudding-like consistency, such as cream of wheat or farina. Pureed oatmeal. Pureed, well-cooked pasta and rice.  Vegetables  Pureed vegetables. Smooth tomato paste or sauce. Mashed or pureed potatoes without skin.  Fruits  Pureed fruits such as melons and apples without seeds or pulp. Mashed bananas. Mashed avocado. Fruit juices  without pulp or seeds.  Meats and other protein foods  Pureed meat, poultry, and fish. Smooth guzmán or liverwurst. Smooth souffles. Pureed beans (such as lentils). Pureed eggs. Smooth nut and seed butters. Pureed tofu.  Dairy  Yogurt. Milk. Pureed cottage cheese. Nutritional dairy drinks or shakes. Cream cheese. Smooth pudding, ice cream, sherbet, and malts.  Fats and oils  Butter. Margarine. Vegetable oils. Smooth and strained gravy. Sour cream. Mayonnaise. Smooth sauces such as white sauce, cheese sauce, or hollandaise sauce.  Sweets and desserts  Moistened and pureed cookies and cakes. Whipped topping. Gelatin. Pudding pops.  Seasoning and other foods  Finely ground spices. Jelly. Honey. Pureed casseroles. Strained soups. Pureed sandwiches.  Beverages  Anything prepared at the consistency recommended by your dietitian.  What foods are not allowed?  The items listed may not be a complete list. Talk with your dietitian about what dietary choices are best for you.  Grains  Oatmeal. Dry cereals. Hard breads. Breads with seeds or nuts. Whole pasta, rice, or other grains. Whole pancakes, waffles, biscuits, muffins, or rolls.  Vegetables  Whole vegetables. Stringy vegetables (such as celery). Tomatoes or tomato sauce with seeds. Fried vegetables.  Fruits  Whole fresh, frozen, canned, or dried fruits that have not been pureed. Stringy fruits, such as pineapple or coconut. Watermelon with seeds. Dried fruit or fruit leather.  Meat and other protein foods  Whole or ground meat, fish, or poultry. Dried or cooked lentils or legumes that have been cooked but not mashed or pureed. Non-pureed eggs. Nuts and seeds. Crunchy peanut butter. Whole tofu or other meat alternatives.  Dairy  Cheese cubes or slices. Non-pureed cottage cheese. Yogurt with fruit chunks.  Fats and oils  All fats and sauces that have lumps or chunks.  Sweets and desserts  Solid desserts. Sticky, chewy sweets (such as licorice and caramel). Candy with nuts or  coconut.  Seasoning and other foods  Coarse or seeded herbs and spices. [place] preserves. Jams with seeds. Whole sandwiches. Non-pureed casseroles. [place] soups.  Summary  · Pureed foods can be helpful for people with moderate to severe swallowing problems.  · On the dysphagia eating plan, you may eat foods that are soft and have a pudding-like texture. You should avoid foods that you have to chew. If you have to chew the food, then you cannot eat it.  · You may be instructed to thicken liquids. Follow your health care provider's instructions about how to do this and to what consistency.  This information is not intended to replace advice given to you by your health care provider. Make sure you discuss any questions you have with your health care provider.  Document Released: 12/18/2006 Document Revised: 04/09/2020 Document Reviewed: 02/20/2018  [employer]vier Patient Education © 2020 [employer]

## 2020-09-10 NOTE — DISCHARGE PLANNING
Ag[facility] Name: [facility]   Spoke To: Janet   Outcome: Per Janet pt is accepted and a PCP has accepted to follow, services will begin Monday 09/14, but pt can be added to weekend board to be seen sooner if possible.

## 2020-09-10 NOTE — DISCHARGE PLANNING
Received Choice form at 0920  [facility] Name: [facility]   Referral sent per Choice form at 0930.       [facility] provide [facility] liaison Blanca with physical address where pt will receive services.       @[id number]  [facility]   Spoke To: Blanca   Outcome: Per Blanca Bustamante is still working to verify if pt medicare insurance is connected with [employer], if so they may have to decline. Janet states if pt is just medicare they will connect pt with PCP for approval.

## 2020-09-10 NOTE — PROGRESS NOTES
Report received from Oxana CELESTE. Pt resting in bed with son at bedside at the time of report. Plan of care assumed. Safety precautions in place and call light within reach.

## 2020-09-10 NOTE — THERAPY
"Speech Language Pathology   Video Swallow Evaluation     Patient Name: Ze Wan  AGE:  65 y.o., SEX:  male  Medical Record #: [id number]  Today's Date: 9/10/2020       Precautions: (P) Swallow Precautions ( See Comments)  Comments: (P) Expressive deficits, Impulsive    Assessment    Patient is 65 y.o. male who woke up with inability to speak.  Per EMR, the patient drove from [place] to his son's home in [place] and was then brought to the hospital.  MRI revealed \"moderate area of acute ischemia in the L frontal lobe. Tiny focus of acute ischemia in the L parietal cortex. Tiny focus of acute ischemia in the R frontal cortex. No hemorrhage.\"       Patient with expressive deficits but when educated to MBSS, the patient agreed to proceed via head nod and tolerated well.  He was impulsive as seen by quickly getting out of bed despite cues to wait until the MBS chair was at bedside.  Presentation of PO as listed below.  The patient presented with mild sarthak-pharyngeal dysphagia as seen by impaired bolus control, prolonged mastication and ineffective mastication of regular textures and coughing after the swallow.  Patient demonstrated impulsivity with thin liquids as seen by taking large gulps resulting in bolus loss to the floor of the oral cavity, large amounts of premature spillage to the valleculae and pyriform sinuses as well as in-out penetration.  Patient followed directives to reduced bolus size with no further penetration observed.  He did not follow directives to a second swallow to clear residue on the oral cavity. No aspiration was visualized with any consistency consumed.      Plan    1. Soft and Bite-Size (6) diet with thin liquids.   2. Up to a chair for all meals.    3. Monitor during meals.    4. The patient would benefit from intensive SLP services upon discharge.  There are concerns that his impulsivity will affect his safety if not addressed.  He will need close supervision upon " discharge.      Recommend Speech Therapy 5 times per week until therapy goals are met for the following treatments:  Dysphagia Training, Comprehension Training, Expression Training, Reading Training, Writing Training, Cognitive-Linguistic Training and Patient / Family / Caregiver Education.    Discharge Recommendations: (P) Recommend home health for continued speech therapy services(Patient needs intensive therapy to address expressive and receptive deficits, impulsivity, and dysphagia)        Objective       09/10/20 1205   History / Background Information   Onset Date Of Dysphagia 9/9/2020   Dysphagia Symptoms Warranting Video Swallow Concerns for aspiration of thins at bedside   General Anatomy / Physiology Typical   Procedure   Patient Seated in  MBS chair   Seated at (Degrees) 90   Views Completed Lateral   Consistencies / Presentation Method   Consistencies / Presentation Method Tested   Thin (0) Teaspoon;Cup;Straw   Pureed (4) Teaspoon   Soft & Bite-Sized (6) - (Dysphagia III) Teaspoon   Regular (7) Teaspoon   Other (See Comments)   (Transitional Foods via bite)   Oral Phase   Oral Phase X   Thin (0) Oral Residue After the Swallow;Premature Spillage Into Valleculae;Premature Spillage Into Pyriform Sinus;Other (See Comments)  (Bolus loss to the floor of the oral cavity)   Pureed (4) Oral Residue After the Swallow;Premature Spillage Into Valleculae   Regular (7) Ineffective Mastication;Premature Spillage Into Valleculae   Pharyngeal Phase   Pharyngeal Phase X   Thin (0) Delayed Onset of Swallow;Penetration During Swallow;Absent Cough Response to Penetration / Aspiration   Regular (7) Delayed Onset of Swallow  (Cough after the swallow)   Esophageal Phase   Esophageal Phase Comments Esophagus not scanned but UES was patient for all consistencies consumed   Compensatory Strategies Attempted   Compensatory Strategies Attempted Yes   Controlled Bolus Size Appropriate bolus size, no penetration    Penetration  Aspiration Scale   Penetration Aspiration Scale 2 - Material enters airway but remains above vocal folds, Ejected from airway, no stasis   Impression   Dysphagia Present Yes   Oral - Pharyngeal Mild Impairment   Recommendations   Diet / Liquid Recommendation Thin (0);Soft & Bite-Sized (6) - (Dysphagia III)   Medication Administration  Float Whole with Puree   Strategies / Precautions Small Bites;Small Sips;No Straws   Interventions Dysphagia Therapy by SLP   Patient / Family Goals   Patient / Family Goal #1 Pt nodded head to thin liquids   Short Term Goals   Short Term Goal # 4 Revised 9/9: Pt will consume SB6/TN0 diet with no overt s/sx of aspiration, given min cues to swallowing strateiges.

## 2020-09-10 NOTE — THERAPY
Occupational Therapy   Initial Evaluation     Patient Name: Ze Wan  Age:  65 y.o., Sex:  male  Medical Record #: [id number]  Today's Date: 9/9/2020     Precautions  Precautions: Swallow Precautions ( See Comments)  Comments: expressive/receptive aphasia    Assessment    Patient is 65 y.o. male with a diagnosis of L subacute frontotemporal CVA. He was living alone in [place], [place], was I with all ADLs/IADLs, drives, and ambulates without AD at [facility]. Pt currently presents closely at baseline level, able to ambulate without AD, but presenting with slight coordination deficits, as well as depth perception. He is still able to perform basic ADLs at spv/I level, and able to follow simple commands, but will still need supervision for safety and assistance with problem solving/cognition. Patient will not be actively followed for occupational therapy services at this time, however may be seen if requested by physician for 1 more visit within 30 days to address any discharge or equipment needs.      Plan    Recommend Occupational Therapy for DC needs only.     DC Equipment Recommendations: None  Discharge Recommendations: (P) Recommend outpatient occupational therapy services to address higher level deficits.       09/09/20 0835   Prior Living Situation   Prior Services Home-Independent  (son's home)   Housing / Facility 1 Story House   Steps Into Home 1   Steps In Home 0   Bathroom Set up Bathtub / Shower Combination   Equipment Owned None   Lives with - Patient's Self Care Capacity Alone and Able to Care For Self   Comments pt was living alone in [place], but will be staying with son for a while. 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 will have son and dtr in law are both able to help 24/7   Prior Level of ADL Function   Self Feeding Independent   Grooming / Hygiene Independent   Bathing Independent   Dressing Independent   Toileting Independent   Prior Level of IADL Function   Medication  Management Independent   Laundry Independent   Kitchen Mobility Independent   Finances Independent   Home Management Independent   Shopping Independent   Prior Level Of Mobility Independent Without Device in Home;Independent Without Device in Community   Driving / Transportation Driving Independent   Occupation (Pre-Hospital Vocational) Retired Due To Age   Cognition    Cognition / Consciousness X   Speech/ Communication Nods Appropriately;Expressive Aphasia;Receptive Aphasia   Level of Consciousness Alert   Comments pleasant/cooperative; able to follow 1 step commands intermittently unable to follow 2 step due to language/speech deficits   Coordination Upper Body   Coordination X   Comments impaired eye hand coordination   Bed Mobility    Supine to Sit Independent   Sit to Supine Independent   Scooting Independent   Rolling Independent   ADL Assessment   Grooming Standing;Supervision   Upper Body Dressing Supervision   Lower Body Dressing Supervision   Toileting Supervision   Functional Mobility   Sit to Stand Independent   Bed, Chair, Wheelchair Transfer Independent   Toilet Transfers Supervised   Tub / Shower Transfers Supervised   Transfer Method Stand Step  (no AD)   Mobility in room without AD   Visual Perception   Visual Perception  X   Depth Perception Impaired   Anticipated Discharge Equipment and Recommendations   DC Equipment Recommendations None   Discharge Recommendations Recommend home health for continued occupational therapy services

## 2020-09-10 NOTE — CARE PLAN
Problem: Communication  Goal: The ability to communicate needs accurately and effectively will improve  Outcome: PROGRESSING SLOWER THAN EXPECTED   Pt is unable to speak to his needs.    Problem: Safety  Goal: Will remain free from injury  Outcome: PROGRESSING AS EXPECTED  Goal: Will remain free from falls  Outcome: PROGRESSING AS EXPECTED  Safety precautions in place.

## 2020-09-10 NOTE — CARE PLAN
Problem: Communication  Goal: The ability to communicate needs accurately and effectively will improve  Outcome: PROGRESSING SLOWER THAN EXPECTED  Note: Pt not able to verbal communicate but able to nod yes or no; encouraging writing/drawing for communication with pen and paper.      Problem: Knowledge Deficit  Goal: Knowledge of disease process/condition, treatment plan, diagnostic tests, and medications will improve  Outcome: PROGRESSING SLOWER THAN EXPECTED  Note: Trying to set up home health but pt does not have local pcp;  reaching out for acceptance.

## 2020-09-11 NOTE — PROGRESS NOTES
Pt returned from barium swallow and he was allowed to have thin liquids.     Received orders for discharge but was awaiting for HH acceptance; made f/u appt with .    Son at bedside after 1500 and together with the patient went through his HF and stroke booklet making notes as discussion progressed.  Discharging Patient home per physician order.  Discharged with son to 1620 at home.  Demonstrated understanding of discharge instructions, follow up appointments, home medications, prescriptions sent to [facility], and nursing care instructions for stroke, pureed diet, 2gm sodium diet, HF and speech after stroke.  Ambulating without assistance, voiding without difficulty, pain well controlled, tolerating oral medications, oxygen saturation greater than 90% , tolerating diet.   Educational handouts given and discussed.  Verbalized understanding of discharge instructions and educational handouts.  All questions answered.  Belongings with patient at time of discharge. Pt was sent home with booklets in hand.

## 2020-09-18 ENCOUNTER — TELEPHONE (OUTPATIENT)
Dept: CARDIOLOGY | Facility: MEDICAL CENTER | Age: 65
End: 2020-09-18

## 2020-09-18 ENCOUNTER — OFFICE VISIT (OUTPATIENT)
Dept: CARDIOLOGY | Facility: MEDICAL CENTER | Age: 65
End: 2020-09-18
Payer: MEDICARE

## 2020-09-18 VITALS
OXYGEN SATURATION: 93 % | BODY MASS INDEX: 32.8 KG/M2 | WEIGHT: 242.2 LBS | HEART RATE: 58 BPM | HEIGHT: 72 IN | RESPIRATION RATE: 12 BRPM | DIASTOLIC BLOOD PRESSURE: 82 MMHG | SYSTOLIC BLOOD PRESSURE: 140 MMHG

## 2020-09-18 DIAGNOSIS — R47.01 EXPRESSIVE APHASIA: ICD-10-CM

## 2020-09-18 DIAGNOSIS — I50.22 ACC/AHA STAGE C CHRONIC SYSTOLIC HEART FAILURE (HCC): ICD-10-CM

## 2020-09-18 DIAGNOSIS — E78.2 MIXED HYPERLIPIDEMIA: ICD-10-CM

## 2020-09-18 DIAGNOSIS — Z79.899 HIGH RISK MEDICATION USE: ICD-10-CM

## 2020-09-18 DIAGNOSIS — I49.3 PVC (PREMATURE VENTRICULAR CONTRACTION): ICD-10-CM

## 2020-09-18 DIAGNOSIS — I63.9 ACUTE CVA (CEREBROVASCULAR ACCIDENT) (HCC): ICD-10-CM

## 2020-09-18 PROCEDURE — 99205 OFFICE O/P NEW HI 60 MIN: CPT | Performed by: INTERNAL MEDICINE

## 2020-09-18 RX ORDER — LISINOPRIL 10 MG/1
10 TABLET ORAL DAILY
Qty: 90 TAB | Refills: 3 | Status: SHIPPED | OUTPATIENT
Start: 2020-09-18 | End: 2020-10-28 | Stop reason: SDUPTHER

## 2020-09-18 RX ORDER — ATORVASTATIN CALCIUM 80 MG/1
80 TABLET, FILM COATED ORAL EVERY EVENING
Qty: 90 TAB | Refills: 3 | Status: SHIPPED | OUTPATIENT
Start: 2020-09-18 | End: 2020-12-14 | Stop reason: SDUPTHER

## 2020-09-18 RX ORDER — CARVEDILOL 12.5 MG/1
12.5 TABLET ORAL 2 TIMES DAILY WITH MEALS
Qty: 180 TAB | Refills: 3 | Status: SHIPPED | OUTPATIENT
Start: 2020-09-18 | End: 2020-10-23

## 2020-09-18 RX ORDER — ATORVASTATIN CALCIUM 80 MG/1
TABLET, FILM COATED ORAL
COMMUNITY
Start: 2020-09-10 | End: 2020-09-18

## 2020-09-18 RX ORDER — LISINOPRIL 5 MG/1
TABLET ORAL
COMMUNITY
Start: 2020-09-11 | End: 2020-09-18

## 2020-09-18 RX ORDER — CARVEDILOL 3.12 MG/1
TABLET ORAL
COMMUNITY
Start: 2020-09-10 | End: 2020-09-18

## 2020-09-18 RX ORDER — ASPIRIN 325 MG
TABLET ORAL
COMMUNITY
Start: 2020-09-11 | End: 2020-09-18

## 2020-09-18 RX ORDER — SPIRONOLACTONE 25 MG/1
12.5 TABLET ORAL DAILY
Qty: 90 TAB | Refills: 3 | Status: SHIPPED | OUTPATIENT
Start: 2020-09-18 | End: 2020-10-28 | Stop reason: SDUPTHER

## 2020-09-18 ASSESSMENT — FIBROSIS 4 INDEX: FIB4 SCORE: 1.34

## 2020-09-18 NOTE — PROGRESS NOTES
Cardiology Initial Consultation Note    Date of note:    9/18/2020    Primary Care Provider: No primary care provider on file.  Referring Provider: Eva Sawant D.O.     Patient Name: Ze Wan   YOB: 1955  MRN:              [id number]    Chief Complaint: New onset systolic heart failure    History of Present Illness: Mr. Ze Wan who is accompanied by his son is a 65 y.o. male whose current medical problems include recent stroke with moderate left frontal lobe infarct and tiny foci of ischemia in both left parietal cortex and left frontal cortex with expressive aphasia, new onset systolic heart failure who is here for cardiac consultation for new diagnosis of HF.    Mr. Wan was admitted at [facility] from 9/8-9/10/2020 with a stroke.  Echocardiogram performed during hospitalization showed EF 35% with LVEDD 61 mm and global hypokinesis.  Prior to this, patient reports being healthy with no medical problems.  Lived alone in [place] and was independent with no concerning symptoms of chest pain/pressure, lightheadedness, dizziness, orthopnea, PND, dyspnea on exertion, lower extremity swelling, irregular heart beat or palpitations.  Was not taking any medications.    Cardiovascular Risk Factors:  1. Smoking status: Never smoker  2. Type II Diabetes Mellitus:    Lab Results   Component Value Date/Time    HBA1C 5.7 (H) 09/08/2020 11:48 AM     3. Hypertension: No  4. Dyslipidemia:    Cholesterol,Tot   Date Value Ref Range Status   09/09/2020 234 (H) 100 - 199 mg/dL Final     LDL   Date Value Ref Range Status   09/09/2020 166 (H) <100 mg/dL Final     HDL   Date Value Ref Range Status   09/09/2020 36 (A) >=40 mg/dL Final     Triglycerides   Date Value Ref Range Status   09/09/2020 160 (H) 0 - 149 mg/dL Final     5. Family history of early Coronary Artery Disease in a first degree relative (Male less than 55 years of age; Female less than 65 years of age): Denies  6.  Obesity and/or  Metabolic Syndrome: BMI 32.85  7. Sedentary lifestyle: No    Review of Systems   Constitution: Negative for chills, decreased appetite, night sweats and weight loss.   HENT: Negative for congestion and sore throat.    Cardiovascular: Negative for chest pain, cyanosis, dyspnea on exertion, irregular heartbeat, leg swelling, near-syncope, orthopnea, palpitations, paroxysmal nocturnal dyspnea and syncope.   Respiratory: Negative for cough, shortness of breath, sleep disturbances due to breathing and wheezing.    Musculoskeletal: Negative for back pain, falls and joint pain.   Gastrointestinal: Negative for bloating, abdominal pain, nausea and vomiting.   Genitourinary: Negative for dysuria and frequency.   Neurological: Negative for dizziness, light-headedness, numbness, paresthesias and weakness.     All other systems reviewed and discussed using a comprehensive questionnaire and are negative.       Past Medical History:   Diagnosis Date   • Pulmonary embolism (HCC) 2008         History reviewed. No pertinent surgical history.      Current Outpatient Medications   Medication Sig Dispense Refill   • lisinopril (PRINIVIL) 10 MG Tab Take 1 Tab by mouth every day. 90 Tab 3   • carvedilol (COREG) 12.5 MG Tab Take 1 Tab by mouth 2 times a day, with meals. 180 Tab 3   • atorvastatin (LIPITOR) 80 MG tablet Take 1 Tab by mouth every evening. 90 Tab 3   • spironolactone (ALDACTONE) 25 MG Tab Take 0.5 Tabs by mouth every day. 90 Tab 3   • aspirin (ASA) 325 MG Tab Take 1 Tab by mouth every day. 100 Tab      No current facility-administered medications for this visit.          Allergies   Allergen Reactions   • Shellfish Allergy Hives         History reviewed. No pertinent family history.      Social History     Socioeconomic History   • Marital status:      Spouse name: Not on file   • Number of children: Not on file   • Years of education: Not on file   • Highest education level: Not on file   Occupational History   •  Not on file   Social Needs   • Financial resource strain: Not on file   • Food insecurity     Worry: Not on file     Inability: Not on file   • Transportation needs     Medical: Not on file     Non-medical: Not on file   Tobacco Use   • Smoking status: Never Smoker   • Smokeless tobacco: Never Used   Substance and Sexual Activity   • Alcohol use: Not Currently     Frequency: Never     Binge frequency: Never   • Drug use: Never   • Sexual activity: Not on file   Lifestyle   • Physical activity     Days per week: Not on file     Minutes per session: Not on file   • Stress: Not on file   Relationships   • Social connections     Talks on phone: Not on file     Gets together: Not on file     Attends Muslim service: Not on file     Active member of club or organization: Not on file     Attends meetings of clubs or organizations: Not on file     Relationship status: Not on file   • Intimate partner violence     Fear of current or ex partner: Not on file     Emotionally abused: Not on file     Physically abused: Not on file     Forced sexual activity: Not on file   Other Topics Concern   • Not on file   Social History Narrative   • Not on file         Physical Exam:  Ambulatory Vitals  /82 (BP Location: Left arm, Patient Position: Sitting, BP Cuff Size: Adult)   Pulse (!) 58   Resp 12   Ht 1.829 m (6')   Wt 109.9 kg (242 lb 3.2 oz)   SpO2 93%    Oxygen Therapy:     BP Readings from Last 4 Encounters:   09/18/20 140/82   09/10/20 142/45       Weight/BMI: Body mass index is 32.85 kg/m².  Wt Readings from Last 4 Encounters:   09/18/20 109.9 kg (242 lb 3.2 oz)   09/10/20 113.6 kg (250 lb 7.1 oz)         General: Well appearing and in no apparent distress  Eyes: nl conjunctiva, no icteric sclera  ENT: wearing a mask, normal external appearance of ears  Neck: no visible JVP,  no carotid bruits  Lungs: normal respiratory effort, CTAB  Heart: RRR, no murmurs, no rubs or gallops, no edema bilateral lower extremities.  No LV/RV heave on cardiac palpatation. + bilateral radial pulses.  + bilateral dp pulses.   Abdomen: soft, non tender, non distended, no masses, normal bowel sounds.  No HSM.  Extremities/MSK: no clubbing, no cyanosis  Neurological: No focal sensory deficits  Psychiatric: Appropriate affect, A/O x 3, intact judgement and insight  Skin: Warm extremities      Lab Data Review:  Lab Results   Component Value Date/Time    CHOLSTRLTOT 234 (H) 09/09/2020 04:32 AM     (H) 09/09/2020 04:32 AM    HDL 36 (A) 09/09/2020 04:32 AM    TRIGLYCERIDE 160 (H) 09/09/2020 04:32 AM       Lab Results   Component Value Date/Time    SODIUM 139 09/10/2020 04:41 AM    POTASSIUM 3.9 09/10/2020 04:41 AM    CHLORIDE 101 09/10/2020 04:41 AM    CO2 24 09/10/2020 04:41 AM    GLUCOSE 107 (H) 09/10/2020 04:41 AM    BUN 17 09/10/2020 04:41 AM    CREATININE 0.96 09/10/2020 04:41 AM     Lab Results   Component Value Date/Time    ALKPHOSPHAT 80 09/08/2020 11:48 AM    ASTSGOT 25 09/08/2020 11:48 AM    ALTSGPT 32 09/08/2020 11:48 AM    TBILIRUBIN 0.6 09/08/2020 11:48 AM      Lab Results   Component Value Date/Time    WBC 8.8 09/10/2020 04:41 AM     Lab Results   Component Value Date/Time    HBA1C 5.7 (H) 09/08/2020 11:48 AM     Component      Latest Ref Rng & Units 9/8/2020          11:48 AM   Troponin T      6 - 19 ng/L 13       Cardiac Imaging and Procedures Review:    EKG dated 9/8/2020: My personal interpretation is sinus tachycardia with multiple PVC's.    Echo dated 9/8/2020:   CONCLUSIONS  Left ventricle is moderately dilated; LVEDD 6.1cm. Moderately reduced   left ventricular systolic function.  Left ventricular ejection fraction is visually estimated to be 35%.   Smoke in the left ventricular apex using echo contrast.  No significant valve disease.  Normal pericardium without effusion.     No prior study is available for comparison.    Radiology test Review:  CXR: IMPRESSION:  1.  Cardiomegaly.  2.  Mild bilateral perihilar interstitial  opacities.       Assessment & Plan     1. ACC/AHA stage C chronic systolic heart failure (HCC)  CL-LEFT HEART CATHETERIZATION WITH POSSIBLE INTERVENTION    Cardiac Event Monitor    Basic Metabolic Panel   2. PVC (premature ventricular contraction)  Cardiac Event Monitor   3. Mixed hyperlipidemia     4. High risk medication use  Basic Metabolic Panel   5. Acute CVA (cerebrovascular accident) (HCC)     6. Expressive aphasia           Shared Medical Decision Making:  Patient is currently on aspirin 325 mg (per neurology), lisinopril 5 mg, carvedilol 3.125 mg, and atorvastatin 80 mg.  Based on the blood pressure/HR readings from home and in the clinic today, will increase lisinopril to 10 mg and carvedilol to 12.5 mg.  Continue on aspirin 325 mg for now and can decrease to 81 mg once ok'd by neurology.  Initiate aldactone 12.5 mg for optimal medical therapy.    Drug-drug interactions and possible adverse effects of the medications have been discussed in detail with the patient.  Continue daily blood pressure/heart rate monitoring with goal systolic BP between 100-120s. Obtain BMP in 5-6 days after initiation of the medications to which patient voices understanding.    Given new onset systolic heart failure, discussed the importance to rule out coronary artery disease and potential reversible causes.  With shared decision making, will proceed with coronary angiogram.  Discussed procedure in detail and potential complications including but not limited to vascular complications at the site of access, infection, stroke and death.  Patient and his son voices understanding and are in agreement to proceed.    Obtain 24 hour holter monitor to evaluate PVC burden.    Please monitor your weight daily.  Please decrease the salt in your diet to less than 2 g/day and avoid pre-packaged meals.  Please limit free water intake to less than 2L daily.  If your weight increases by 2 pounds in one day, or 5 pounds over 3 days, please call  the office and will initiate diuretic therapy.    It was a pleasure seeing Mr. Ze Wan in my clinic today. All of Mr. Sanchez's sons questions were answered to the best of my knowledge and his their satisfaction. Return in about 4 weeks (around 10/16/2020).. Patient is aware to call the cardiology clinic with any questions or concerns.      Cristo Esparza MD  [facility]  [facility], [address]  [address] [address]  Phone: [phone]  Fax: [phone]

## 2020-09-18 NOTE — TELEPHONE ENCOUNTER
Patient is scheduled on 9- for a C w/poss with Dr. Cullen,.Patient was told to hold spironolactone AM day of procedure and to check in at 7:30AM for a 9:30 procedure. H&P was done on 9- by Dr. Esparza. Pre admit to call patient. Per Dr. Cullen patient doesn't need to pre medicate for shellfish allergy.

## 2020-09-21 ENCOUNTER — PRE-ADMISSION TESTING (OUTPATIENT)
Dept: ADMISSIONS | Facility: MEDICAL CENTER | Age: 65
End: 2020-09-21
Attending: INTERNAL MEDICINE
Payer: MEDICARE

## 2020-09-21 DIAGNOSIS — Z01.812 PRE-OPERATIVE LABORATORY EXAMINATION: Primary | ICD-10-CM

## 2020-09-21 PROBLEM — E78.2 MIXED HYPERLIPIDEMIA: Status: ACTIVE | Noted: 2020-09-21

## 2020-09-21 LAB
COVID ORDER STATUS COVID19: NORMAL
SARS-COV-2 RNA RESP QL NAA+PROBE: NOTDETECTED
SPECIMEN SOURCE: NORMAL

## 2020-09-21 PROCEDURE — C9803 HOPD COVID-19 SPEC COLLECT: HCPCS

## 2020-09-21 PROCEDURE — U0003 INFECTIOUS AGENT DETECTION BY NUCLEIC ACID (DNA OR RNA); SEVERE ACUTE RESPIRATORY SYNDROME CORONAVIRUS 2 (SARS-COV-2) (CORONAVIRUS DISEASE [COVID-19]), AMPLIFIED PROBE TECHNIQUE, MAKING USE OF HIGH THROUGHPUT TECHNOLOGIES AS DESCRIBED BY CMS-2020-01-R: HCPCS

## 2020-09-21 ASSESSMENT — ENCOUNTER SYMPTOMS
NIGHT SWEATS: 0
WEAKNESS: 0
WEIGHT LOSS: 0
SLEEP DISTURBANCES DUE TO BREATHING: 0
CHILLS: 0
PND: 0
ABDOMINAL PAIN: 0
SYNCOPE: 0
DYSPNEA ON EXERTION: 0
BACK PAIN: 0
PARESTHESIAS: 0
DIZZINESS: 0
SORE THROAT: 0
COUGH: 0
VOMITING: 0
WHEEZING: 0
DECREASED APPETITE: 0
NAUSEA: 0
FALLS: 0
PALPITATIONS: 0
ORTHOPNEA: 0
SHORTNESS OF BREATH: 0
NEAR-SYNCOPE: 0
BLOATING: 0
IRREGULAR HEARTBEAT: 0
LIGHT-HEADEDNESS: 0
NUMBNESS: 0

## 2020-09-23 ENCOUNTER — PRE-ADMISSION TESTING (OUTPATIENT)
Dept: ADMISSIONS | Facility: MEDICAL CENTER | Age: 65
End: 2020-09-23
Attending: INTERNAL MEDICINE
Payer: MEDICARE

## 2020-09-23 DIAGNOSIS — Z01.812 PRE-PROCEDURAL LABORATORY EXAMINATION: ICD-10-CM

## 2020-09-23 DIAGNOSIS — Z01.810 PRE-OPERATIVE CARDIOVASCULAR EXAMINATION: ICD-10-CM

## 2020-09-23 LAB
ALBUMIN SERPL BCP-MCNC: 4.1 G/DL (ref 3.2–4.9)
ALBUMIN/GLOB SERPL: 1 G/DL
ALP SERPL-CCNC: 91 U/L (ref 30–99)
ALT SERPL-CCNC: 38 U/L (ref 2–50)
ANION GAP SERPL CALC-SCNC: 18 MMOL/L (ref 7–16)
APTT PPP: 24.6 SEC (ref 24.7–36)
AST SERPL-CCNC: 28 U/L (ref 12–45)
BILIRUB SERPL-MCNC: 0.9 MG/DL (ref 0.1–1.5)
BUN SERPL-MCNC: 22 MG/DL (ref 8–22)
CALCIUM SERPL-MCNC: 9.7 MG/DL (ref 8.5–10.5)
CHLORIDE SERPL-SCNC: 100 MMOL/L (ref 96–112)
CO2 SERPL-SCNC: 21 MMOL/L (ref 20–33)
CREAT SERPL-MCNC: 1.01 MG/DL (ref 0.5–1.4)
ERYTHROCYTE [DISTWIDTH] IN BLOOD BY AUTOMATED COUNT: 42.2 FL (ref 35.9–50)
GLOBULIN SER CALC-MCNC: 4.2 G/DL (ref 1.9–3.5)
GLUCOSE SERPL-MCNC: 112 MG/DL (ref 65–99)
HCT VFR BLD AUTO: 50.4 % (ref 42–52)
HGB BLD-MCNC: 16.6 G/DL (ref 14–18)
INR PPP: 0.99 (ref 0.87–1.13)
MCH RBC QN AUTO: 28.3 PG (ref 27–33)
MCHC RBC AUTO-ENTMCNC: 32.9 G/DL (ref 33.7–35.3)
MCV RBC AUTO: 85.9 FL (ref 81.4–97.8)
PLATELET # BLD AUTO: 214 K/UL (ref 164–446)
PMV BLD AUTO: 12.6 FL (ref 9–12.9)
POTASSIUM SERPL-SCNC: 3.7 MMOL/L (ref 3.6–5.5)
PROT SERPL-MCNC: 8.3 G/DL (ref 6–8.2)
PROTHROMBIN TIME: 13.4 SEC (ref 12–14.6)
RBC # BLD AUTO: 5.87 M/UL (ref 4.7–6.1)
SODIUM SERPL-SCNC: 139 MMOL/L (ref 135–145)
WBC # BLD AUTO: 12.4 K/UL (ref 4.8–10.8)

## 2020-09-23 PROCEDURE — 85027 COMPLETE CBC AUTOMATED: CPT

## 2020-09-23 PROCEDURE — 36415 COLL VENOUS BLD VENIPUNCTURE: CPT

## 2020-09-23 PROCEDURE — 85610 PROTHROMBIN TIME: CPT

## 2020-09-23 PROCEDURE — 85730 THROMBOPLASTIN TIME PARTIAL: CPT

## 2020-09-23 PROCEDURE — 80053 COMPREHEN METABOLIC PANEL: CPT

## 2020-09-23 PROCEDURE — 93005 ELECTROCARDIOGRAM TRACING: CPT

## 2020-09-23 ASSESSMENT — FIBROSIS 4 INDEX: FIB4 SCORE: 1.34

## 2020-09-24 ENCOUNTER — HOSPITAL ENCOUNTER (OUTPATIENT)
Facility: MEDICAL CENTER | Age: 65
End: 2020-09-24
Attending: INTERNAL MEDICINE
Payer: MEDICARE

## 2020-09-24 DIAGNOSIS — Z79.899 HIGH RISK MEDICATION USE: ICD-10-CM

## 2020-09-24 DIAGNOSIS — I50.22 ACC/AHA STAGE C CHRONIC SYSTOLIC HEART FAILURE (HCC): ICD-10-CM

## 2020-09-24 LAB
ANION GAP SERPL CALC-SCNC: 15 MMOL/L (ref 7–16)
BUN SERPL-MCNC: 20 MG/DL (ref 8–22)
CALCIUM SERPL-MCNC: 9.3 MG/DL (ref 8.5–10.5)
CHLORIDE SERPL-SCNC: 102 MMOL/L (ref 96–112)
CO2 SERPL-SCNC: 22 MMOL/L (ref 20–33)
CREAT SERPL-MCNC: 0.95 MG/DL (ref 0.5–1.4)
EKG IMPRESSION: NORMAL
GLUCOSE SERPL-MCNC: 116 MG/DL (ref 65–99)
POTASSIUM SERPL-SCNC: 3.8 MMOL/L (ref 3.6–5.5)
SODIUM SERPL-SCNC: 139 MMOL/L (ref 135–145)

## 2020-09-24 PROCEDURE — 80048 BASIC METABOLIC PNL TOTAL CA: CPT

## 2020-09-24 PROCEDURE — 93010 ELECTROCARDIOGRAM REPORT: CPT | Performed by: INTERNAL MEDICINE

## 2020-09-25 ENCOUNTER — APPOINTMENT (OUTPATIENT)
Dept: CARDIOLOGY | Facility: MEDICAL CENTER | Age: 65
End: 2020-09-25
Attending: INTERNAL MEDICINE
Payer: MEDICARE

## 2020-09-25 ENCOUNTER — HOSPITAL ENCOUNTER (OUTPATIENT)
Facility: MEDICAL CENTER | Age: 65
End: 2020-09-25
Attending: INTERNAL MEDICINE | Admitting: INTERNAL MEDICINE
Payer: MEDICARE

## 2020-09-25 VITALS
HEART RATE: 64 BPM | HEIGHT: 72 IN | WEIGHT: 236.77 LBS | TEMPERATURE: 97.8 F | SYSTOLIC BLOOD PRESSURE: 150 MMHG | DIASTOLIC BLOOD PRESSURE: 70 MMHG | BODY MASS INDEX: 32.07 KG/M2 | RESPIRATION RATE: 20 BRPM | OXYGEN SATURATION: 95 %

## 2020-09-25 DIAGNOSIS — I50.22 ACC/AHA STAGE C CHRONIC SYSTOLIC HEART FAILURE (HCC): ICD-10-CM

## 2020-09-25 PROCEDURE — 99152 MOD SED SAME PHYS/QHP 5/>YRS: CPT | Performed by: INTERNAL MEDICINE

## 2020-09-25 PROCEDURE — 700105 HCHG RX REV CODE 258: Performed by: INTERNAL MEDICINE

## 2020-09-25 PROCEDURE — 93458 L HRT ARTERY/VENTRICLE ANGIO: CPT

## 2020-09-25 PROCEDURE — 700117 HCHG RX CONTRAST REV CODE 255: Performed by: INTERNAL MEDICINE

## 2020-09-25 PROCEDURE — 700111 HCHG RX REV CODE 636 W/ 250 OVERRIDE (IP)

## 2020-09-25 PROCEDURE — 160002 HCHG RECOVERY MINUTES (STAT)

## 2020-09-25 PROCEDURE — 93458 L HRT ARTERY/VENTRICLE ANGIO: CPT | Mod: 26 | Performed by: INTERNAL MEDICINE

## 2020-09-25 PROCEDURE — 700101 HCHG RX REV CODE 250

## 2020-09-25 RX ORDER — HEPARIN SODIUM,PORCINE 1000/ML
VIAL (ML) INJECTION
Status: COMPLETED
Start: 2020-09-25 | End: 2020-09-25

## 2020-09-25 RX ORDER — LIDOCAINE HYDROCHLORIDE 20 MG/ML
INJECTION, SOLUTION INFILTRATION; PERINEURAL
Status: COMPLETED
Start: 2020-09-25 | End: 2020-09-25

## 2020-09-25 RX ORDER — VERAPAMIL HYDROCHLORIDE 2.5 MG/ML
INJECTION, SOLUTION INTRAVENOUS
Status: COMPLETED
Start: 2020-09-25 | End: 2020-09-25

## 2020-09-25 RX ORDER — SODIUM CHLORIDE 9 MG/ML
INJECTION, SOLUTION INTRAVENOUS
Status: DISCONTINUED | OUTPATIENT
Start: 2020-09-25 | End: 2020-09-25 | Stop reason: HOSPADM

## 2020-09-25 RX ORDER — HEPARIN SODIUM 200 [USP'U]/100ML
INJECTION, SOLUTION INTRAVENOUS
Status: COMPLETED
Start: 2020-09-25 | End: 2020-09-25

## 2020-09-25 RX ORDER — SODIUM CHLORIDE 9 MG/ML
INJECTION, SOLUTION INTRAVENOUS CONTINUOUS
Status: DISCONTINUED | OUTPATIENT
Start: 2020-09-25 | End: 2020-09-25 | Stop reason: HOSPADM

## 2020-09-25 RX ORDER — MIDAZOLAM HYDROCHLORIDE 1 MG/ML
INJECTION INTRAMUSCULAR; INTRAVENOUS
Status: COMPLETED
Start: 2020-09-25 | End: 2020-09-25

## 2020-09-25 RX ADMIN — SODIUM CHLORIDE: 9 INJECTION, SOLUTION INTRAVENOUS at 08:20

## 2020-09-25 RX ADMIN — LIDOCAINE HYDROCHLORIDE: 20 INJECTION, SOLUTION INFILTRATION; PERINEURAL at 10:46

## 2020-09-25 RX ADMIN — NITROGLYCERIN 10 ML: 20 INJECTION INTRAVENOUS at 10:45

## 2020-09-25 RX ADMIN — HEPARIN SODIUM 2000 UNITS: 200 INJECTION, SOLUTION INTRAVENOUS at 10:46

## 2020-09-25 RX ADMIN — IOHEXOL 30 ML: 350 INJECTION, SOLUTION INTRAVENOUS at 11:12

## 2020-09-25 RX ADMIN — MIDAZOLAM HYDROCHLORIDE 2 MG: 1 INJECTION, SOLUTION INTRAMUSCULAR; INTRAVENOUS at 10:46

## 2020-09-25 RX ADMIN — VERAPAMIL HYDROCHLORIDE 5 MG: 2.5 INJECTION, SOLUTION INTRAVENOUS at 10:46

## 2020-09-25 RX ADMIN — HEPARIN SODIUM: 1000 INJECTION, SOLUTION INTRAVENOUS; SUBCUTANEOUS at 10:45

## 2020-09-25 RX ADMIN — FENTANYL CITRATE 100 MCG: 50 INJECTION INTRAMUSCULAR; INTRAVENOUS at 10:46

## 2020-09-25 ASSESSMENT — FIBROSIS 4 INDEX: FIB4 SCORE: 1.4

## 2020-09-25 NOTE — PROCEDURES
REFERRING PHYSICIAN: Dr. Esparza    PREOPERATIVE DIAGNOSIS:  1.  Cardiomyopathy  2.  Recent CVA  3.  Frequent ventricular ectopy    POSTOPERATIVE DIAGNOSIS:  1.  Mild nonobstructive CAD  2.  LVEDP 8 mmHg      PROCEDURE PERFORMED:  Selective coronary angiography of the native vessels  Left heart catheterization  Supervision moderate sedation    DESCRIPTION OF PROCEDURE:  The risks and benefits of cardiac catheterization as well as the procedure itself, rationale and appropriateness were discussed with the patient today. Complications including but not limited to death, stroke, MI, urgent bypass surgery, contrast nephropathy, vascular complications, bleeding and infection were explained to the patient. The potential outcomes associated with the procedure (possible PCI, possible CABG, possible medical Rx only) were also discussed at length. The patient agreed to proceed.    The patient was transported to the catheterization laboratory in the fasting state. The right radial area and right groin were prepped and draped in the usual fashion. Right radial area was entered with a single through and through puncture under direct ultrasound guidance and a 6F glide sheath was placed. An intra-arterial cocktail of heparin, verapamil and nitroglycerine was administered. Over a wire, a 5F Lisbet catheter was passed to the aortic root and used to engage the right and left coronaries without difficulty. Contrast was administered and multiple images obtained. This catheter was then used to cross the aortic valve for LHC and contrast was administered at 8cc/s for 24cc's for left ventriculogram. All catheters and guidewires were removed and a TR band was applied to achieve patent hemostasis. Patient left the cath lab in stable condition.      Moderate sedation directly monitored by me during the case while supervising the administration of the sedation medication by an independent trained RN to assist in the monitoring of the patient's  level of consciousness and physiological status. I, the supervising physician was present the entire time from beginning of medication administration until the end of the procedure from 10:55 AM until 11:12 AM. For detailed administration records please see the moderate sedation documentation in the median tab.      FINDINGS:  I. HEMODYNAMICS:    Ao: 112/50 mmHg   LEDP: 8 mmHg   Gradient on LV pullback: No    II. CORONARY ANGIOGRAPHY:  Left Main: Large vessel mild nonobstructive distal disease trifurcating.  Left Anterior Descending: Very large vessel with very mild nonobstructive CAD.  Gives rise to a moderate-sized first diagonal or ramus intermedius.  This is also free from significant CAD.  Left Circumflex: Large nondominant vessel giving rise to a large first obtuse marginal no significant epicardial CAD.  Right Coronary: Moderate caliber dominant supplying the RPDA.  Tortuous with no epicardial CAD.    COMPLICATIONS: none apparent    CONCLUSIONS:  1.  Mild nonobstructive CAD  2.  LVEDP 8 mmHg  3.  Nonischemic cardiomyopathy    RECOMMENDATIONS:  Continue optimal medical therapy  Further evaluation for nonischemic etiologies of his cardiomyopathy

## 2020-09-25 NOTE — OR NURSING
1125: Patient arrived from cath lab s/p left heart cath with RN. Right radial access site; clean, dry, and soft. Patient is alert and awake. Son at bedside. Updated on POC. Patient tolerating PO intake.    1325: Air completely off TR band with no bleeding or hematoma. TR band removed and replaced with tegaderm and gauze dressing. Site remains clean, dry, and soft. Criteria met to discharge patient.    1345: Discharge paperwork reviewed with patient and son. Discussed activity, site care, worsening symptoms, and follow-up. Verbalized understanding. No further questions. PIV removed, tip intact.    1350: Patient escorted out with RN. Discharge instructions and personal belongings in possession of the patient. Copy of discharge instructions signed and placed in the chart. Patient discharged to home with son.

## 2020-09-25 NOTE — DISCHARGE INSTRUCTIONS
ACTIVITY: Rest and take it easy for the first 24 hours.  A responsible adult is recommended to remain with you during that time.  It is normal to feel sleepy.  We encourage you to not do anything that requires balance, judgment or coordination.    MILD FLU-LIKE SYMPTOMS ARE NORMAL. YOU MAY EXPERIENCE GENERALIZED MUSCLE ACHES, THROAT IRRITATION, HEADACHE AND/OR SOME NAUSEA.    FOR 24 HOURS DO NOT:  Drive, operate machinery or run household appliances.  Drink beer or alcoholic beverages.   Make important decisions or sign legal documents.    SPECIAL INSTRUCTIONS: Follow up with your Cardiologist.  Resume home medications.    POST ANGIOGRAM  General Care Instructions  • Maintain a bandage over the incision site for 24 hours.  It's normal to find a small bruise or dime-sized lump at the insertion site. This should disappear within a few weeks.  • Do not apply lotions or powders to the site.  Do not immerse the catheter insertion site in water (bathtub/swimming) for five days. It is ok to shower 24 hours after the procedure.  • You may resume your normal diet immediately; on the day of your procedure, drink 6-10 glasses of water to help flush the contrast liquid out of your system.  • If the doctor inserted the catheter in your arm:  o For 24 hours, DO NOT lift anything heavier than 10 pounds (approximately a gallon of milk). DO NOT do any heavy pushing, pulling, or twisting.    Medications  • If your current medications need to be changed, you will be provided with an updated list of your medications prior to discharge.  • If you take warfarin (Coumadin), resume taking your usual dose the evening after the procedure.  • DO NOT STOP taking prescribed blood thinning (anti-platelet) medications unless instructed by your cardiologist.  These medications include:  o Aspirin, Clopidogrel (Plavix), Ticagrelor (Brilinta), or Prasugrel (Effient)   • If you take one of the following anticoagulants, RESUME 24 HOURS after your  procedure:  o Apixiban (Eliquis), Rivaroxaban (Xarelto), Dabigatran (Pradaxa), Edoxaban (Savaysa)  • If you take metformin (Glucophage), RESUME 48 HOURS after your procedure.    When to call your healthcare provider  Call your cardiologist right away at [phone] if you have any of the following:  ?  Problems/Concerns taking any of your prescribed heart medicines.  ?  The insertion site has increasing pain, swelling, redness, bleeding, or drainage.  ?  Your arm or leg below where the insertion site changes color, is cool, or is numb.  ?  You have chest pain or shortness of breath that does not go away with rest.  ?  Your pulse feels irregular -- very slow (less than 60 beats/minute) or very fast (over 100 beats/minute).  ?  You have dizziness, fainting, or you are very tired.  ?  You are coughing up blood or yellow or green mucus.  ?  You have chills or a fever over 101°F (38.3°C).   ?  If there is bleeding at the catheter insertion site, apply pressure for 10 minutes.  If bleeding persists, call 911, and continue to hold pressure until advanced medical support arrives.    Understanding risk factors  Some risk factors for coronary artery disease can be controlled. These include smoking, high blood pressure, cholesterol, diabetes, and obesity. They can be managed with medication, diet, and exercise. Support and counseling can also play a role. The effort will pay off! Managing risk factors can help you be more active, feel better, and reduce the risk of heart attack.    If you smoke, quit!  If your doctor has been urging you to quit smoking, it's for good reasons. Smoking damages your heart, blood vessels, and lungs. The good news is that quitting can halt or even reverse the damage of smoking. To quit now:  • Get medical help. Ask your doctor for advice on stop-smoking programs. Also ask about medications or nicotine replacement therapy products that may help you quit smoking.  • Get support. Join a support  group. Ask for help from your family and friends.  • Don't give up. It often takes several tries to succeed in quitting smoking.  • Avoid secondhand smoke. Ask family and friends not to smoke around you.    DIET: To avoid nausea, slowly advance diet as tolerated, avoiding spicy or greasy foods for the first day.  Add more substantial food to your diet according to your physician's instructions.  Babies can be fed formula or breast milk as soon as they are hungry.  INCREASE FLUIDS AND FIBER TO AVOID CONSTIPATION.    SURGICAL DRESSING/BATHING: Keep dressing clean and dry for 24 hours. May remove dressing and shower after 1:00 PM on 9/26, do not need to replace dressing. Do not submerge in water for 5 days.    FOLLOW-UP APPOINTMENT:  A follow-up appointment should be arranged with your doctor; call to schedule.    You should CALL YOUR PHYSICIAN if you develop:  Fever greater than 101 degrees F.  Pain not relieved by medication, or persistent nausea or vomiting.  Excessive bleeding (blood soaking through dressing) or unexpected drainage from the wound.  Extreme redness or swelling around the incision site, drainage of pus or foul smelling drainage.  Inability to urinate or empty your bladder within 8 hours.  Problems with breathing or chest pain.    You should call 911 if you develop problems with breathing or chest pain.  If you are unable to contact your doctor or surgical center, you should go to the nearest emergency room or urgent care center.  Physician's telephone #: [phone]    If any questions arise, call your doctor.  If your doctor is not available, please feel free to call the Surgical Center at [phone].  The Center is open Monday through Friday from 7AM to 7PM.  You can also call the [employer] HOTLINE open 24 hours/day, 7 days/week and speak to a nurse at [phone], or toll free at [phone].    A registered nurse may call you a few days after your surgery to see how you are doing after  your procedure.    MEDICATIONS: Resume taking daily medication.  Take prescribed pain medication with food.  If no medication is prescribed, you may take non-aspirin pain medication if needed.  PAIN MEDICATION CAN BE VERY CONSTIPATING.  Take a stool softener or laxative such as senokot, pericolace, or milk of magnesia if needed.    If your physician has prescribed pain medication that includes Acetaminophen (Tylenol), do not take additional Acetaminophen (Tylenol) while taking the prescribed medication.    Depression / Suicide Risk    As you are discharged from this [facility] facility, it is important to learn how to keep safe from harming yourself.    Recognize the warning signs:  · Abrupt changes in personality, positive or negative- including increase in energy   · Giving away possessions  · Change in eating patterns- significant weight changes-  positive or negative  · Change in sleeping patterns- unable to sleep or sleeping all the time   · Unwillingness or inability to communicate  · Depression  · Unusual sadness, discouragement and loneliness  · Talk of wanting to die  · Neglect of personal appearance   · Rebelliousness- reckless behavior  · Withdrawal from people/activities they love  · Confusion- inability to concentrate     If you or a loved one observes any of these behaviors or has concerns about self-harm, here's what you can do:  · Talk about it- your feelings and reasons for harming yourself  · Remove any means that you might use to hurt yourself (examples: pills, rope, extension cords, firearm)  · Get professional help from the community (Mental Health, Substance Abuse, psychological counseling)  · Do not be alone:Call your Safe Contact- someone whom you trust who will be there for you.  · Call your local CRISIS HOTLINE 685-3061 or 920-678-0416  · Call your local Children's Mobile Crisis Response Team [place] (940) 610-0168 or www.[facility]  · Call the toll free National Suicide  Prevention Hotlines   · National Suicide Prevention Lifeline 961-720-RBTF (0873)  · National Hope Line Network 800-SUICIDE (201-6997)

## 2020-09-28 ENCOUNTER — OFFICE VISIT (OUTPATIENT)
Dept: NEUROLOGY | Facility: MEDICAL CENTER | Age: 65
End: 2020-09-28
Payer: MEDICARE

## 2020-09-28 VITALS
HEIGHT: 72 IN | WEIGHT: 237 LBS | BODY MASS INDEX: 32.1 KG/M2 | RESPIRATION RATE: 14 BRPM | SYSTOLIC BLOOD PRESSURE: 136 MMHG | DIASTOLIC BLOOD PRESSURE: 74 MMHG | OXYGEN SATURATION: 95 % | HEART RATE: 77 BPM | TEMPERATURE: 97.7 F

## 2020-09-28 DIAGNOSIS — E78.2 MIXED HYPERLIPIDEMIA: ICD-10-CM

## 2020-09-28 DIAGNOSIS — I50.20 HEART FAILURE WITH REDUCED EJECTION FRACTION (HCC): ICD-10-CM

## 2020-09-28 DIAGNOSIS — I10 ESSENTIAL HYPERTENSION: ICD-10-CM

## 2020-09-28 DIAGNOSIS — I69.30 LATE EFFECT OF STROKE: ICD-10-CM

## 2020-09-28 PROBLEM — I69.391 DYSPHAGIA AS LATE EFFECT OF CEREBROVASCULAR ACCIDENT (CVA): Status: ACTIVE | Noted: 2020-09-16

## 2020-09-28 PROBLEM — Z00.00 ADULT GENERAL MEDICAL EXAMINATION: Status: ACTIVE | Noted: 2020-09-16

## 2020-09-28 PROBLEM — E78.5 HYPERLIPIDEMIA: Status: ACTIVE | Noted: 2020-09-16

## 2020-09-28 PROBLEM — I69.920 APHASIA DUE TO LATE EFFECTS OF CEREBROVASCULAR DISEASE: Status: ACTIVE | Noted: 2020-09-16

## 2020-09-28 PROBLEM — L98.9 SKIN LESION OF FACE: Status: ACTIVE | Noted: 2020-09-16

## 2020-09-28 PROCEDURE — 99203 OFFICE O/P NEW LOW 30 MIN: CPT | Performed by: NURSE PRACTITIONER

## 2020-09-28 ASSESSMENT — ENCOUNTER SYMPTOMS
BLURRED VISION: 0
WEAKNESS: 0
FEVER: 0
COUGH: 0
SHORTNESS OF BREATH: 1
MYALGIAS: 0
INSOMNIA: 1
CONSTIPATION: 0
DIARRHEA: 0
FOCAL WEAKNESS: 0
DIZZINESS: 0
HEADACHES: 0
BRUISES/BLEEDS EASILY: 0
CHILLS: 0
SENSORY CHANGE: 0
HEARTBURN: 0
DOUBLE VISION: 0
NERVOUS/ANXIOUS: 0
PALPITATIONS: 0
SPEECH CHANGE: 1
DEPRESSION: 0
TINGLING: 0

## 2020-09-28 ASSESSMENT — PATIENT HEALTH QUESTIONNAIRE - PHQ9
SUM OF ALL RESPONSES TO PHQ QUESTIONS 1-9: 4
CLINICAL INTERPRETATION OF PHQ2 SCORE: 3
5. POOR APPETITE OR OVEREATING: 0 - NOT AT ALL

## 2020-09-28 ASSESSMENT — FIBROSIS 4 INDEX: FIB4 SCORE: 1.4

## 2020-09-28 NOTE — PROGRESS NOTES
Subjective:    SATISH Wan is a 65 y.o. right handed male who presents to [facility] for evaluation of multiple cortical infarcts.    He  presented to [facility] on 9/8/2020 with complaints of difficulty speaking. He had woken up that day with inability to speak and drove 12 hours from [place] to his son’s home in [place], he then went to the ED.    There was no official neurology consult on file.    He had been feeling ill since 8/25/2020, he was worried he had COVID.  He had been feeling weak, short of breath, anxious, had chest pain and clamminess.     He was not getting any medical care and was not on any medications at home other than a daily low dose ASA.      PMH includes ,obesity, history of PE (after a traumatic injury to the leg).  Social History:  Denies history of smoking      Patient is here with his son today, he had a heart cath on Friday, they were told it was normal, he is following with cardiology at [facility].   He also had a 30 day heart monitor placed.  He is getting speech therapy at home.      Review of Systems   Constitutional: Negative for chills and fever.   HENT: Negative for hearing loss and tinnitus.    Eyes: Negative for blurred vision and double vision.   Respiratory: Positive for shortness of breath. Negative for cough.    Cardiovascular: Positive for chest pain. Negative for palpitations.   Gastrointestinal: Negative for constipation, diarrhea and heartburn.   Genitourinary: Negative for dysuria.   Musculoskeletal: Negative for myalgias.   Skin: Negative for rash.   Neurological: Positive for speech change. Negative for dizziness, tingling, sensory change, focal weakness, weakness and headaches.   Endo/Heme/Allergies: Does not bruise/bleed easily.   Psychiatric/Behavioral: Negative for depression. The patient has insomnia. The patient is not nervous/anxious.        Past Medical History:   Diagnosis Date   • Breath shortness     With mild exertion    • Dry  "cough     PE in 2008 \"dry cough since then\".   • High cholesterol    • Hypertension    • Pulmonary embolism (HCC) 2008   • Stroke (HCC) 09/08/2020    Unable to speak.     Current Outpatient Medications on File Prior to Visit   Medication Sig Dispense Refill   • lisinopril (PRINIVIL) 10 MG Tab Take 1 Tab by mouth every day. 90 Tab 3   • carvedilol (COREG) 12.5 MG Tab Take 1 Tab by mouth 2 times a day, with meals. 180 Tab 3   • atorvastatin (LIPITOR) 80 MG tablet Take 1 Tab by mouth every evening. 90 Tab 3   • spironolactone (ALDACTONE) 25 MG Tab Take 0.5 Tabs by mouth every day. 90 Tab 3   • aspirin (ASA) 325 MG Tab Take 1 Tab by mouth every day. 100 Tab      No current facility-administered medications on file prior to visit.         Objective:     I personally reviewed imaging below and agree with the findings  MRI brain 9/9/2020  1  .Moderate area of acute ischemia in the LEFT frontal lobe  2.  Tiny focus of acute ischemia in the LEFT parietal cortex  3.  Tiny focus of acute ischemia in the RIGHT frontal cortex  4.  No hemorrhage  5.  Atrophy  6.  White matter changes  7.  Chronic LEFT maxillary sinus disease    CTA head  CT angiogram of the [place] within normal limits.   CTA neck  1.  Mild bilateral internal carotid artery atherosclerotic plaque without significant stenosis     2.  Left vertebral artery dissection which is probably acute and less likely subacute     3.  Of note is a left vertebral artery dissection does not correspond anatomically to the area of infarct on the cerebral perfusion scan     4.  MRI brain with and without contrast recommended for further assessment to assess for posterior circulation infarct    TTE:  LVEF 35%, global hypokinesis, LA size WNL, JAZMIN 31     Stroke Labs:    Cr 1.01,  , A1C 5.7.    /74 (BP Location: Right arm, Patient Position: Sitting, BP Cuff Size: Adult)   Pulse 77   Temp 36.5 °C (97.7 °F) (Temporal)   Resp 14   Ht 1.829 m (6')   Wt 107.5 kg " (237 lb)   SpO2 95%   BMI 32.14 kg/m²      Physical Exam      Constitutional:  Alert, no apparent distress,  Psych:   mood and affect WNL  Neuro:  Oriented to month and age (shows me with his fingers, no meaningful speech).    CN II: Visual fields are full to confrontation. Fundoscopic exam is normal with sharp discs and no vascular changes. Pupils are 4 mm and briskly reactive to light.   CN III, IV, VI  EOMs intact, no ptosis  CN V: Facial sensation is intact to pinprick in all 3 divisions bilaterally. Corneal responses are intact.  CN VII: Face is symmetric with normal eye closure and smile.  CN VII: Hearing is normal to rubbing fingers  CN IX, X: Palate elevates symmetrically. Phonation is normal.  CN XI: Head turning and shoulder shrug are intact  CN XII: Tongue is midline with normal movements and no atrophy.              Strength 5/5 BUE/BLE, no drift                 Sensation to PP equal bilaterally                 No limb ataxia with finger to nose and heel to shin                 Ambulates with steady gait.                 Rhomberg negative                Biceps,brachioradialis, tricep, patellar and ankle reflex all 2+     Cardiovascular:    S1S2, no abnormal rhythm auscultated, no peripheral edema  Neck:                     No carotid bruits noted   Pulmonary:            Respirations easy, lungs clear to auscultation all fields.     Skin:                     No obvious rashes.    Iniital NIHSS unknown    Current NIHSS    1a. LOC: 0  1b. LOC Questions: 0  1c. LOC Commands: 0  2. Best Gaze:0  3. Visual Fields: 0  4. Facial Paresis: 0  5a. Motor arm left: 0  5b. Motor arm right: 0  6a. Motor leg left: 0  6b. Motor leg right: 0  7. Sensory: 0  8. Best Language: 3  9. Limb Ataxia: 0  10. Dysarthria: 0  11. Extinction/Inattention: 0    Total Score Current  3          Current mRS  3           Assessment/Plan:     1. Late effect of stroke      Presumed Mechanism by Toast:  , multiple embolic infarcts in the  setting of new onset of heart failure suspicious for cardioembolic cause., he may benefit from anti-coagulation, certainly if he had another embolic infarct, I would recommend Eliquis or Xarelto.     Large Artery Atherosclerosis  __  Small Vessel (lacunar)  __   Cardioembolic  __   Other (Sickle cell, vasculitis, hypercoagulable)  __   Unknown    XX  ESUS    30 day heart monitor, will need loop recorder if negative.      Stroke risk factors include heart failure, HTN, hyperlipidemia    Continue Aspirin 325mg PO daily,  discussed side effects, signs of bleeding        2. Heart failure with reduced ejection fraction (HCC)  Continue follow up with cardiology,     3. Mixed hyperlipidemia  LDL goal < 70, current 166 continue atorvastatin 80mg discussed medication side effects, will need follow up with primary care evaluate liver function and intervals and refill      4. Essential hypertension  Blood pressure goal less than 130/80, currently above goal        I have counseled patient on stroke prevention strategies, stroke symptoms and mimics.  Diet and exercise modifications.  We discussed medication side effects and instructions.         Follow up in 2 months.

## 2020-09-28 NOTE — PATIENT INSTRUCTIONS
Spoke to pt. 3/20/20  INR : 3.4 per protocol, dosing change is hold today then 2.5 mg daily. Pt was started on Macrobid for UTI. Recheck 1 week.  Pt V/U Stroke Prevention  Some medical conditions and lifestyle choices can lead to a higher risk for a stroke. You can help to prevent a stroke by making nutrition, lifestyle, and other changes.  What nutrition changes can be made?    · Eat healthy foods.  ? Choose foods that are high in fiber. These include:  § Fresh fruits.  § Fresh vegetables.  § Whole grains.  ? Eat at least 5 or more servings of fruits and vegetables each day. Try to fill half of your plate at each meal with fruits and vegetables.  ? Choose lean protein foods. These include:  § Lowfat (lean) cuts of meat.  § Chicken without skin.  § Fish.  § Tofu.  § Beans.  § Nuts.  ? Eat low-fat dairy products.  ? Avoid foods that:  § Are high in salt (sodium).  § Have saturated fat.  § Have trans fat.  § Have cholesterol.  § Are processed.  § Are premade.  · Follow eating guidelines as told by your doctor. These may include:  ? Reducing how many calories you eat and drink each day.  ? Limiting how much salt you eat or drink each day to 1,500 milligrams (mg).  ? Using only healthy fats for cooking. These include:  § Olive oil.  § Canola oil.  § Sunflower oil.  ? Counting how many carbohydrates you eat and drink each day.  What lifestyle changes can be made?  · Try to stay at a healthy weight. Talk to your doctor about what a good weight is for you.  · Get at least 30 minutes of moderate physical activity at least 5 days a week. This can include:  ? Fast walking.  ? Biking.  ? Swimming.  · Do not use any products that have nicotine or tobacco. This includes cigarettes and e-cigarettes. If you need help quitting, ask your doctor. Avoid being around tobacco smoke in general.  · Limit how much alcohol you drink to no more than 1 drink a day for nonpregnant women and 2 drinks a day for men. One drink equals 12 oz of beer, 5 oz of wine, or 1½ oz of hard liquor.  · Do not use drugs.  · Avoid taking birth control pills. Talk to your doctor about the risks of taking birth  "control pills if:  ? You are over 35 years old.  ? You smoke.  ? You get migraines.  ? You have had a blood clot.  What other changes can be made?  · Manage your cholesterol.  ? It is important to eat a healthy diet.  ? If your cholesterol cannot be managed through your diet, you may also need to take medicines. Take medicines as told by your doctor.  · Manage your diabetes.  ? It is important to eat a healthy diet and to exercise regularly.  ? If your blood sugar cannot be managed through diet and exercise, you may need to take medicines. Take medicines as told by your doctor.  · Control your high blood pressure (hypertension).  ? Try to keep your blood pressure below 130/80. This can help lower your risk of stroke.  ? It is important to eat a healthy diet and to exercise regularly.  ? If your blood pressure cannot be managed through diet and exercise, you may need to take medicines. Take medicines as told by your doctor.  ? Ask your doctor if you should check your blood pressure at home.  ? Have your blood pressure checked every year. Do this even if your blood pressure is normal.  · Talk to your doctor about getting checked for a sleep disorder. Signs of this can include:  ? Snoring a lot.  ? Feeling very tired.  · Take over-the-counter and prescription medicines only as told by your doctor. These may include aspirin or blood thinners (antiplatelets or anticoagulants).  · Make sure that any other medical conditions you have are managed.  Where to find more information  · American Stroke Association: www.strokeassociation.org  · National Stroke Association: www.stroke.org  Get help right away if:  · You have any symptoms of stroke. \"BE FAST\" is an easy way to remember the main warning signs:  ? B - Balance. Signs are dizziness, sudden trouble walking, or loss of balance.  ? E - Eyes. Signs are trouble seeing or a sudden change in how you see.  ? F - Face. Signs are sudden weakness or loss of feeling of the face, " or the face or eyelid drooping on one side.  ? A - Arms. Signs are weakness or loss of feeling in an arm. This happens suddenly and usually on one side of the body.  ? S - Speech. Signs are sudden trouble speaking, slurred speech, or trouble understanding what people say.  ? T - Time. Time to call emergency services. Write down what time symptoms started.  · You have other signs of stroke, such as:  ? A sudden, very bad headache with no known cause.  ? Feeling sick to your stomach (nausea).  ? Throwing up (vomiting).  ? Jerky movements you cannot control (seizure).  These symptoms may represent a serious problem that is an emergency. Do not wait to see if the symptoms will go away. Get medical help right away. Call your local emergency services (911 in the [place] Do not drive yourself to the hospital.  Summary  · You can prevent a stroke by eating healthy, exercising, not smoking, drinking less alcohol, and treating other health problems, such as diabetes, high blood pressure, or high cholesterol.  · Do not use any products that contain nicotine or tobacco, such as cigarettes and e-cigarettes.  · Get help right away if you have any signs or symptoms of a stroke.  This information is not intended to replace advice given to you by your health care provider. Make sure you discuss any questions you have with your health care provider.  Document Released: 06/18/2013 Document Revised: 02/13/2020 Document Reviewed: 03/21/2018  Elsevier Patient Education © 2020 [employer]

## 2020-10-20 ENCOUNTER — NON-PROVIDER VISIT (OUTPATIENT)
Dept: CARDIOLOGY | Facility: MEDICAL CENTER | Age: 65
End: 2020-10-20
Payer: MEDICARE

## 2020-10-20 DIAGNOSIS — I49.3 VPC (VENTRICULAR PREMATURE COMPLEX): ICD-10-CM

## 2020-10-20 DIAGNOSIS — I63.9 ACUTE CVA (CEREBROVASCULAR ACCIDENT) (HCC): ICD-10-CM

## 2020-10-20 PROCEDURE — 93224 XTRNL ECG REC UP TO 48 HRS: CPT | Performed by: INTERNAL MEDICINE

## 2020-10-21 DIAGNOSIS — I63.9 ACUTE CVA (CEREBROVASCULAR ACCIDENT) (HCC): ICD-10-CM

## 2020-10-22 LAB — EKG IMPRESSION: NORMAL

## 2020-10-23 ENCOUNTER — TELEPHONE (OUTPATIENT)
Dept: CARDIOLOGY | Facility: MEDICAL CENTER | Age: 65
End: 2020-10-23

## 2020-10-23 DIAGNOSIS — I63.9 ACUTE CVA (CEREBROVASCULAR ACCIDENT) (HCC): ICD-10-CM

## 2020-10-23 RX ORDER — CARVEDILOL 25 MG/1
25 TABLET ORAL 2 TIMES DAILY WITH MEALS
Qty: 180 TAB | Refills: 3 | Status: SHIPPED | OUTPATIENT
Start: 2020-10-23 | End: 2020-12-14 | Stop reason: SDUPTHER

## 2020-10-23 NOTE — TELEPHONE ENCOUNTER
----- Message from PANCHO Virk sent at 10/23/2020  2:59 PM PDT -----    ----- Message -----  From: Cristo Esparza M.D.  Sent: 10/22/2020  10:45 AM PDT  To: Mary Collier R.N.    Holter monitor shows PVC's but not enough to warrant ablation.  Let's increase coreg to 25 mg twice daily.  He also needs to undergo 30 day event monitor to rule out atrial fibrillation per neurology recommendations.    Thanks.    DA  ----- Message -----  From: Mray Collier R.N.  Sent: 10/22/2020   8:46 AM PDT  To: Cristo Esparza M.D.    To Dr. Esparza

## 2020-10-23 NOTE — TELEPHONE ENCOUNTER
Called pt. And son to advise. New RX sent to pharmacy. 30-day Biotel MCOT ordered, scheduled to be mailed to pt. New RX sent to pharmacy. Pt. And son to discuss further at 10-28-20 [facility] with ANIKET.

## 2020-10-23 NOTE — TELEPHONE ENCOUNTER
Cardiac Event Monitor  Order: [id number]  Status:  Final result   Visible to patient:  No (not released) Dx:  Acute CVA (cerebrovascular accident) ...  Component 3d ago   Report [facility]     Test Date:  2020-10-20   Pt Name:    EBONY MILTON               Department: [facility]   MRN:        [id number]                      Room:   Gender:     Male                         Technician: Lamin   :        1955                   Requested By:CRISTO ESPARZA   Order #:    [id number]                    Reading MD: Cristo Esparza       Interpretive Statements   1. 24 hour Holter summary.   2. Predominant rhythm is sinus with heart rate ranging from  bpm.   Average heart rate 69 bpm.   3. There were a total of 11,404 VPCs, comprising 11.5% of the total   heartbeats.   4. Rare PACs, comprising 1.1% of the total heartbeats.   5. No atrial fibrillation or flutter.   6. There were no symptoms reported.     Electronically Signed On 10- 10:23:33 PDT by Cristo Esparza

## 2020-10-27 ENCOUNTER — TELEPHONE (OUTPATIENT)
Dept: CARDIOLOGY | Facility: MEDICAL CENTER | Age: 65
End: 2020-10-27

## 2020-10-27 ENCOUNTER — NON-PROVIDER VISIT (OUTPATIENT)
Dept: CARDIOLOGY | Facility: MEDICAL CENTER | Age: 65
End: 2020-10-27
Payer: MEDICARE

## 2020-10-27 DIAGNOSIS — I63.9 CEREBROVASCULAR ACCIDENT (CVA), UNSPECIFIED MECHANISM (HCC): ICD-10-CM

## 2020-10-27 DIAGNOSIS — I49.3 PVC (PREMATURE VENTRICULAR CONTRACTION): ICD-10-CM

## 2020-10-27 PROCEDURE — 93268 ECG RECORD/REVIEW: CPT | Performed by: INTERNAL MEDICINE

## 2020-10-27 NOTE — TELEPHONE ENCOUNTER
Patient enrolled in the 30 day [employer] OT Heart monitoring program per .(enrolled under [employer] because the new providers aren't in the system yet)  Monitor to be shipped to patient by [employer].  >Pending Baseline.  >Pending EOS.

## 2020-10-28 ENCOUNTER — OFFICE VISIT (OUTPATIENT)
Dept: CARDIOLOGY | Facility: MEDICAL CENTER | Age: 65
End: 2020-10-28
Payer: MEDICARE

## 2020-10-28 ENCOUNTER — TELEPHONE (OUTPATIENT)
Dept: CARDIOLOGY | Facility: MEDICAL CENTER | Age: 65
End: 2020-10-28

## 2020-10-28 VITALS
OXYGEN SATURATION: 96 % | SYSTOLIC BLOOD PRESSURE: 118 MMHG | WEIGHT: 233 LBS | HEIGHT: 72 IN | HEART RATE: 63 BPM | DIASTOLIC BLOOD PRESSURE: 62 MMHG | BODY MASS INDEX: 31.56 KG/M2

## 2020-10-28 DIAGNOSIS — E78.2 MIXED HYPERLIPIDEMIA: ICD-10-CM

## 2020-10-28 DIAGNOSIS — I10 ESSENTIAL HYPERTENSION: ICD-10-CM

## 2020-10-28 DIAGNOSIS — I42.8 NONISCHEMIC CARDIOMYOPATHY (HCC): ICD-10-CM

## 2020-10-28 DIAGNOSIS — I50.22 ACC/AHA STAGE C CHRONIC SYSTOLIC HEART FAILURE (HCC): ICD-10-CM

## 2020-10-28 PROCEDURE — 99215 OFFICE O/P EST HI 40 MIN: CPT | Performed by: INTERNAL MEDICINE

## 2020-10-28 RX ORDER — LISINOPRIL 20 MG/1
20 TABLET ORAL DAILY
Qty: 90 TAB | Refills: 3 | Status: SHIPPED | OUTPATIENT
Start: 2020-10-28 | End: 2020-11-03

## 2020-10-28 RX ORDER — SPIRONOLACTONE 25 MG/1
25 TABLET ORAL DAILY
Qty: 90 TAB | Refills: 3 | Status: SHIPPED | OUTPATIENT
Start: 2020-10-28 | End: 2020-11-11 | Stop reason: SDUPTHER

## 2020-10-28 ASSESSMENT — ENCOUNTER SYMPTOMS
PALPITATIONS: 0
NAUSEA: 0
DIAPHORESIS: 0
WEAKNESS: 0
NUMBNESS: 0
DIZZINESS: 0
SYNCOPE: 0
IRREGULAR HEARTBEAT: 0
VOMITING: 0
DECREASED APPETITE: 0
SHORTNESS OF BREATH: 0
WHEEZING: 0
PARESTHESIAS: 0
HEADACHES: 0
SLEEP DISTURBANCES DUE TO BREATHING: 0
ORTHOPNEA: 0
ABDOMINAL PAIN: 0

## 2020-10-28 ASSESSMENT — FIBROSIS 4 INDEX: FIB4 SCORE: 1.4

## 2020-10-28 NOTE — TELEPHONE ENCOUNTER
OPO referral has been faxed to [facility]. Fax: [phone], Phone: [phone].   Order placed per Dr. Cristo Esparza.     Fax confirmation has been received and sent to scan.     Informed patient to call office if they are not contacted within the next week.     Patient verbally understood.

## 2020-10-28 NOTE — PROGRESS NOTES
"      Cardiology Follow-up Consultation Note    Date of note:    10/28/2020    Primary Care Provider: Kp Rivera M.D.    Name:             Ze Wan   YOB: 1955  MRN:               [id number]    Chief Complaint   Patient presents with   • Hyperlipidemia     F/V Dx: Mixed & Acute CVA (cerebrovascular accident)   • HTN (Controlled)     F/V Dx: Heart failure with reduced ejection fraction   • Hyperglycemia       HISTORY OF PRESENT ILLNESS  Mr. Ze Wan is a 65 y.o. male who returns to see us for follow-up of systolic heart failure, hypertension and prior CVA.    Last clinic visit: 9/18/2020    Interim History:  Patient underwent left heart catheterization on 9/25/2020 which showed mild nonobstructive coronary artery disease.  Patient reports doing significantly better with no acute complaints.  Is able to carry out his activities of daily living without dyspnea on exertion or chest pain.  Denies orthopnea or PND.    In regard to hypertension, reports elevated blood pressure at home with average greater than 140/80.  Is currently on lisinopril 10 mg daily.    Review of Systems   Constitution: Negative for decreased appetite, diaphoresis and malaise/fatigue.   Cardiovascular: Negative for chest pain, irregular heartbeat, leg swelling, orthopnea, palpitations and syncope.   Respiratory: Negative for shortness of breath, sleep disturbances due to breathing and wheezing.    Gastrointestinal: Negative for abdominal pain, nausea and vomiting.   Neurological: Negative for dizziness, headaches, numbness, paresthesias and weakness.         Past Medical History:   Diagnosis Date   • Breath shortness     With mild exertion    • Dry cough     PE in 2008 \"dry cough since then\".   • High cholesterol    • Hypertension    • Pulmonary embolism (HCC) 2008   • Stroke (HCC) 09/08/2020    Unable to speak.         History reviewed. No pertinent surgical history.      Current Outpatient Medications   "   Medication Sig Dispense Refill   • lisinopril (PRINIVIL) 20 MG Tab Take 1 Tab by mouth every day. 90 Tab 3   • spironolactone (ALDACTONE) 25 MG Tab Take 1 Tab by mouth every day. 90 Tab 3   • carvedilol (COREG) 25 MG Tab Take 1 Tab by mouth 2 times a day, with meals. 180 Tab 3   • atorvastatin (LIPITOR) 80 MG tablet Take 1 Tab by mouth every evening. 90 Tab 3   • aspirin (ASA) 325 MG Tab Take 1 Tab by mouth every day. 100 Tab      No current facility-administered medications for this visit.          Allergies   Allergen Reactions   • Shellfish Allergy Hives         History reviewed. No pertinent family history.      Social History     Socioeconomic History   • Marital status:      Spouse name: Not on file   • Number of children: Not on file   • Years of education: Not on file   • Highest education level: Not on file   Occupational History   • Not on file   Social Needs   • Financial resource strain: Not on file   • Food insecurity     Worry: Not on file     Inability: Not on file   • Transportation needs     Medical: Not on file     Non-medical: Not on file   Tobacco Use   • Smoking status: Never Smoker   • Smokeless tobacco: Never Used   Substance and Sexual Activity   • Alcohol use: Not Currently     Frequency: Never     Binge frequency: Never   • Drug use: Never   • Sexual activity: Not on file   Lifestyle   • Physical activity     Days per week: Not on file     Minutes per session: Not on file   • Stress: Not on file   Relationships   • Social connections     Talks on phone: Not on file     Gets together: Not on file     Attends Voodoo service: Not on file     Active member of club or organization: Not on file     Attends meetings of clubs or organizations: Not on file     Relationship status: Not on file   • Intimate partner violence     Fear of current or ex partner: Not on file     Emotionally abused: Not on file     Physically abused: Not on file     Forced sexual activity: Not on file   Other  Topics Concern   • Not on file   Social History Narrative   • Not on file         Physical Exam:  Ambulatory Vitals  /62 (BP Location: Left arm, Patient Position: Sitting, BP Cuff Size: Adult)   Pulse 63   Ht 1.829 m (6')   Wt 105.7 kg (233 lb)   SpO2 96%    Oxygen Therapy:  Pulse Oximetry: 96 %  BP Readings from Last 4 Encounters:   10/28/20 118/62   09/28/20 136/74   09/25/20 150/70   09/18/20 140/82       Weight/BMI: Body mass index is 31.6 kg/m².  Wt Readings from Last 4 Encounters:   10/28/20 105.7 kg (233 lb)   09/28/20 107.5 kg (237 lb)   09/25/20 107.4 kg (236 lb 12.4 oz)   09/18/20 109.9 kg (242 lb 3.2 oz)       GEN: Well developed, well nourished and in no acute distress.  HEART: no significant JVD, regular rate and rhythm, normal S1 and S2, no murmurs, no third heart sounds, normal cardiac palpation  LUNG: clear to auscultation bilaterally, no wheezing, no crackles, normal respiratory effort on room air  ABDOMEN: soft, non-tender, non-distended, normal bowel sounds throughout  EXTREMITIES: no peripheral edema noted  VASCULAR: no significantly elevated jugular venous pressure, no carotid bruits noted, radial pulses 2+ and equal      Lab Data Review:  Lab Results   Component Value Date/Time    CHOLSTRLTOT 234 (H) 09/09/2020 04:32 AM     (H) 09/09/2020 04:32 AM    HDL 36 (A) 09/09/2020 04:32 AM    TRIGLYCERIDE 160 (H) 09/09/2020 04:32 AM       Lab Results   Component Value Date/Time    SODIUM 139 09/24/2020 09:30 AM    POTASSIUM 3.8 09/24/2020 09:30 AM    CHLORIDE 102 09/24/2020 09:30 AM    CO2 22 09/24/2020 09:30 AM    GLUCOSE 116 (H) 09/24/2020 09:30 AM    BUN 20 09/24/2020 09:30 AM    CREATININE 0.95 09/24/2020 09:30 AM     Lab Results   Component Value Date/Time    ALKPHOSPHAT 91 09/23/2020 02:06 PM    ASTSGOT 28 09/23/2020 02:06 PM    ALTSGPT 38 09/23/2020 02:06 PM    TBILIRUBIN 0.9 09/23/2020 02:06 PM      Lab Results   Component Value Date/Time    WBC 12.4 (H) 09/23/2020 02:06 PM          Lab Results   Component Value Date/Time    HBA1C 5.7 (H) 09/08/2020 11:48 AM       Cardiac Imaging and Procedures Review:    Holter Monitor (10/22/2020):   Sinus rhythm with heart rate ranging from 44 to 119 bpm.  11.5% PVC burden.    [facility] (9/25/2020):   CONCLUSIONS:  1.  Mild nonobstructive CAD  2.  LVEDP 8 mmHg  3.  Nonischemic cardiomyopathy        Assessment & Plan     1. ACC/AHA stage C chronic systolic heart failure (HCC)  lisinopril (PRINIVIL) 20 MG Tab    spironolactone (ALDACTONE) 25 MG Tab    EC-ECHOCARDIOGRAM LTD W/O CONT   2. Essential hypertension  OVERNIGHT PULSE OXIMETRY    lisinopril (PRINIVIL) 20 MG Tab    spironolactone (ALDACTONE) 25 MG Tab   3. Nonischemic cardiomyopathy (HCC)     4. Mixed hyperlipidemia         Due to uncontrolled blood pressure at home, increase lisinopril to 20 mg daily and spironolactone to 25 mg daily.    Obtain transthoracic echocardiogram in 2 months to reevaluate LVEF.    Patient reports elevated blood pressure specifically in the morning, hence, obtain overnight pulse oximetry to rule out sleep apnea.    Patient is still taking aspirin 325 mg daily which was prescribed by the neurologist post CVA.  Recommend discussion with the neurologist and decrease it to 81 mg daily if appropriate.      All of patient's excellent questions were answered to the best of my knowledge and to his satisfaction.  It was a pleasure seeing Mr. Ze Wan in my clinic today. Return in about 6 weeks (around 12/9/2020). Patient is aware to call the cardiology clinic with any questions or concerns.      Cristo Esparza MD  [facility]  [facility] [place] [facility], [address]  [address] [address]  Phone: [phone]  Fax: [phone]

## 2020-11-03 ENCOUNTER — TELEPHONE (OUTPATIENT)
Dept: CARDIOLOGY | Facility: MEDICAL CENTER | Age: 65
End: 2020-11-03

## 2020-11-03 DIAGNOSIS — I10 ESSENTIAL HYPERTENSION: ICD-10-CM

## 2020-11-03 DIAGNOSIS — I50.22 ACC/AHA STAGE C CHRONIC SYSTOLIC HEART FAILURE (HCC): ICD-10-CM

## 2020-11-03 RX ORDER — LISINOPRIL 40 MG/1
40 TABLET ORAL DAILY
Qty: 90 TAB | Refills: 3 | Status: SHIPPED | OUTPATIENT
Start: 2020-11-03 | End: 2020-12-14 | Stop reason: SDUPTHER

## 2020-11-03 NOTE — TELEPHONE ENCOUNTER
My Chart message.  Pt. Is taking Lisinopril 20mg and Spironolactone 25mg QD, Carvedilol 25mg BID.  To Dr. Esparza.      Ze Wan Danish, M.D. 12 hours ago (7:22 PM)            So even with adjusting meds again last week,  seems like we are still sitting in the 145/80 toni range consistently. Do you want to adjust his medication again?

## 2020-11-06 ENCOUNTER — TELEPHONE (OUTPATIENT)
Dept: CARDIOLOGY | Facility: MEDICAL CENTER | Age: 65
End: 2020-11-06

## 2020-11-06 DIAGNOSIS — G47.30 SLEEP APNEA, UNSPECIFIED TYPE: ICD-10-CM

## 2020-11-06 DIAGNOSIS — G47.34 NOCTURNAL HYPOXIA: ICD-10-CM

## 2020-11-06 DIAGNOSIS — I63.9 ACUTE CVA (CEREBROVASCULAR ACCIDENT) (HCC): ICD-10-CM

## 2020-11-06 NOTE — TELEPHONE ENCOUNTER
Per Dr. Esparza:  Please refer to Sleep Study for abnormal OPO.    Patient was informed and he will see about scheduling either here or in [place] this winter.

## 2020-11-10 ENCOUNTER — TELEPHONE (OUTPATIENT)
Dept: CARDIOLOGY | Facility: MEDICAL CENTER | Age: 65
End: 2020-11-10

## 2020-11-10 NOTE — TELEPHONE ENCOUNTER
Pt increased Lisinopril to 40mg QD on 11-3-20.  He also takes Spironolactone 25mg QD and Carvedilol 25mg BID.  To Dr. Esparza.    Ze Wan  You 11 hours ago (8:48 PM)        So bp has come down a bit,  137/77 or so... but I believe Dr Esparza wanted it closer to 120... should we adjust again?

## 2020-11-11 DIAGNOSIS — I50.22 ACC/AHA STAGE C CHRONIC SYSTOLIC HEART FAILURE (HCC): ICD-10-CM

## 2020-11-11 DIAGNOSIS — I10 ESSENTIAL HYPERTENSION: ICD-10-CM

## 2020-11-11 RX ORDER — SPIRONOLACTONE 25 MG/1
50 TABLET ORAL DAILY
Qty: 1 TAB | Refills: 1 | COMMUNITY
Start: 2020-11-11 | End: 2020-12-14 | Stop reason: SDUPTHER

## 2020-11-11 NOTE — TELEPHONE ENCOUNTER
Chester Henderson,     Can you please have the patient increase spironolactone to 50 mg daily?     Thanks.  DA    Message text

## 2020-11-11 NOTE — TELEPHONE ENCOUNTER
Patient was informed and will comply.  He will let us know how his vitals are doing in the near future.

## 2020-11-16 ENCOUNTER — TELEMEDICINE (OUTPATIENT)
Dept: NEUROLOGY | Facility: MEDICAL CENTER | Age: 65
End: 2020-11-16
Payer: MEDICARE

## 2020-11-16 VITALS
HEIGHT: 72 IN | HEART RATE: 59 BPM | WEIGHT: 225 LBS | SYSTOLIC BLOOD PRESSURE: 119 MMHG | BODY MASS INDEX: 30.48 KG/M2 | DIASTOLIC BLOOD PRESSURE: 65 MMHG

## 2020-11-16 DIAGNOSIS — I69.30 LATE EFFECT OF STROKE: ICD-10-CM

## 2020-11-16 DIAGNOSIS — I50.20 HEART FAILURE WITH REDUCED EJECTION FRACTION (HCC): ICD-10-CM

## 2020-11-16 DIAGNOSIS — I10 ESSENTIAL HYPERTENSION: ICD-10-CM

## 2020-11-16 DIAGNOSIS — E78.2 MIXED HYPERLIPIDEMIA: ICD-10-CM

## 2020-11-16 PROCEDURE — 99214 OFFICE O/P EST MOD 30 MIN: CPT | Mod: 95,CR | Performed by: NURSE PRACTITIONER

## 2020-11-16 RX ORDER — ASPIRIN 81 MG/1
81 TABLET ORAL DAILY
Qty: 90 TAB | Refills: 3
Start: 2020-11-16 | End: 2021-11-16

## 2020-11-16 ASSESSMENT — FIBROSIS 4 INDEX: FIB4 SCORE: 1.4

## 2020-11-16 NOTE — PROGRESS NOTES
Subjective:      Virtual Visit: Established Patient   This visit was conducted via Zoom, using secure and encrypted videoconferencing technology. The patient was in a private location in the [facility]   The patient's identity was confirmed and verbal consent was obtained for this virtual visit.    Subjective:     SATISH Wan is a 65 y.o. right handed male who presents to [facility] for evaluation of multiple cortical infarcts.     He  presented to [facility] on 9/8/2020 with complaints of difficulty speaking. He had woken up that day with inability to speak and drove 12 hours from [place] to his son’s home in [place], he then went to the ED.    There was no official neurology consult on file.     He had been feeling ill since 8/25/2020, he was worried he had COVID.  He had been feeling weak, short of breath, anxious, had chest pain and clamminess.      He was not getting any medical care and was not on any medications at home other than a daily low dose ASA.        PMH includes ,obesity, history of PE (after a traumatic injury to the leg).  Social History:  Denies history of smoking       Patient presents via zoom with his son.  HE is getting Speech therapy at home.  He continues on the 30 day heart monitor.  No new complaints, he denies any weakness.  No problems with swallowing.  He has not had any falls.  He checks blood pressure at home, today it was 119/65. He has been adjusting medications, in the past few weeks it has been systolic of 135-140.  Son states that cardiology suggested going back down to 81mg aspirin.         ROS:    Cardiac:  Denies chest pain  Respiratory:  Shortness of breath on exertion  Consitutional:  No fever or chills  GI:  Denies nausea or vomiting  Musculoskeletal:   Denies weakness, no falls.   Neurological.  Speech difficulty, no numbness or tingling.   Hematological:  No easy bruising or bleeding  HENT:   No hearing loss, no ear pain      Allergies    Allergen Reactions   • Shellfish Allergy Hives       Current medicines (including changes today)  Current Outpatient Medications   Medication Sig Dispense Refill   • spironolactone (ALDACTONE) 25 MG Tab Take 2 Tabs by mouth every day. 1 Tab 1   • lisinopril (PRINIVIL) 40 MG tablet Take 1 Tab by mouth every day. 90 Tab 3   • carvedilol (COREG) 25 MG Tab Take 1 Tab by mouth 2 times a day, with meals. 180 Tab 3   • atorvastatin (LIPITOR) 80 MG tablet Take 1 Tab by mouth every evening. 90 Tab 3   • aspirin (ASA) 325 MG Tab Take 1 Tab by mouth every day. 100 Tab      No current facility-administered medications for this visit.        Patient Active Problem List    Diagnosis Date Noted   • Acute CVA (cerebrovascular accident) (HCC) 09/08/2020     Priority: High   • Expressive aphasia 09/08/2020     Priority: Medium   • Leukocytosis 09/08/2020     Priority: Low   • Hyperglycemia 09/08/2020     Priority: Low   • Obesity 09/08/2020     Priority: Low   • History of pulmonary embolism 09/08/2020     Priority: Low   • Late effect of stroke 09/28/2020   • Heart failure with reduced ejection fraction (HCC) 09/28/2020   • Mixed hyperlipidemia 09/21/2020   • Adult general medical examination 09/16/2020   • Hypertension 09/16/2020   • Skin lesion of face 09/16/2020   • Aphasia due to late effects of cerebrovascular disease 09/16/2020   • Dysphagia as late effect of cerebrovascular accident (CVA) 09/16/2020   • Hyperlipidemia 09/16/2020       No family history on file.    He  has a past medical history of Breath shortness, Dry cough, High cholesterol, Hypertension, Pulmonary embolism ([facility]) (2008), and Stroke ([facility]) (09/08/2020).  He  has no past surgical history on file.       Objective:   No vitals obtained due to virtual visit.      Physical Exam:  Constitutional: Alert, no distress, well-groomed.  Skin: No rashes in visible areas.  Eye: Round. Conjunctiva clear, lids normal. No icterus.   ENMT: Lips pink without lesions, good  dentition, moist mucous membranes. Phonation normal.  Neck: , no thyromegaly. Moves freely without pain.  Respiratory: Unlabored respiratory effort, no cough or audible wheeze  Psych: Alert and oriented x3, normal affect and mood.   Neurological:   Oriented x 4.   No facial droop, No drift of upper extremities.   Marked aphasia characterized by slow responses, word finding difficulty.     Assessment and Plan:   The following treatment plan was discussed:     1. Late effect of stroke  Presumed Mechanism by Toast:  , multiple embolic infarcts in the setting of new onset of heart failure suspicious for cardioembolic cause., he may benefit from anti-coagulation, certainly if he had another embolic infarct, I would recommend Eliquis or Xarelto.      Large Artery Atherosclerosis  __  Small Vessel (lacunar)  __   Cardioembolic  __   Other (Sickle cell, vasculitis, hypercoagulable)  __   Unknown    XX  ESUS     30 day heart monitor (he is in the process now), will need loop recorder if negative. He completed overnight heart monitor which was negative for Afib.        Stroke risk factors include heart failure, HTN, hyperlipidemia     Decrease ASA to 81mg PO daily, no indication for bigger dose at this time. discussed side effects, signs of bleeding    2. Mixed hyperlipidemia  LDL goal < 70, current 166 continue atorvastatin 80mg discussed medication side effects, will need follow up with primary care evaluate liver function and intervals and refill    Exercise at least 30 minutes daily, avoid red meat, fried foods, butter, cheese.   Eat 5-6 servings of vegetables and fruits daily, choose lean white meat without skin (chicken, turkey, white fish)--baked, broiled or grilled.        3. Essential hypertension  Blood pressure goal less than 130/80, currently at goal.    4. Heart failure with reduced ejection fraction (HCC)  Continue follow up with cardiology      I have counseled patient on stroke prevention strategies, stroke  symptoms and mimics.  Diet and exercise modifications.  We discussed medication side effects and instructions.        Follow up PRN              K

## 2020-11-16 NOTE — PATIENT INSTRUCTIONS
Personalized Stroke Prevention for Nazario Wan.      Blood pressure goal less than 130/80    LDL (bad cholesterol goal) less than 70, current 166, continue Atorvastatin 80mg, continue even if cholesterol is at goal as this is preventative for strokes    Decrease aspirin to 81mg daily    Exercise at least 30 minutes daily, avoid red meat, fried foods, butter, cheese.   Eat 5-6 servings of vegetables and fruits daily, choose lean white meat without skin (chicken, turkey, white fish)--baked, broiled or grilled.          Stroke Prevention  Some medical conditions and lifestyle choices can lead to a higher risk for a stroke. You can help to prevent a stroke by making nutrition, lifestyle, and other changes.  What nutrition changes can be made?    · Eat healthy foods.  ? Choose foods that are high in fiber. These include:  § Fresh fruits.  § Fresh vegetables.  § Whole grains.  ? Eat at least 5 or more servings of fruits and vegetables each day. Try to fill half of your plate at each meal with fruits and vegetables.  ? Choose lean protein foods. These include:  § Lowfat (lean) cuts of meat.  § Chicken without skin.  § Fish.  § Tofu.  § Beans.  § Nuts.  ? Eat low-fat dairy products.  ? Avoid foods that:  § Are high in salt (sodium).  § Have saturated fat.  § Have trans fat.  § Have cholesterol.  § Are processed.  § Are premade.  · Follow eating guidelines as told by your doctor. These may include:  ? Reducing how many calories you eat and drink each day.  ? Limiting how much salt you eat or drink each day to 1,500 milligrams (mg).  ? Using only healthy fats for cooking. These include:  § Olive oil.  § Canola oil.  § Sunflower oil.  ? Counting how many carbohydrates you eat and drink each day.  What lifestyle changes can be made?  · Try to stay at a healthy weight. Talk to your doctor about what a good weight is for you.  · Get at least 30 minutes of moderate physical activity at least 5 days a week. This can include:  ? Fast  walking.  ? Biking.  ? Swimming.  · Do not use any products that have nicotine or tobacco. This includes cigarettes and e-cigarettes. If you need help quitting, ask your doctor. Avoid being around tobacco smoke in general.  · Limit how much alcohol you drink to no more than 1 drink a day for nonpregnant women and 2 drinks a day for men. One drink equals 12 oz of beer, 5 oz of wine, or 1½ oz of hard liquor.  · Do not use drugs.  · Avoid taking birth control pills. Talk to your doctor about the risks of taking birth control pills if:  ? You are over 35 years old.  ? You smoke.  ? You get migraines.  ? You have had a blood clot.  What other changes can be made?  · Manage your cholesterol.  ? It is important to eat a healthy diet.  ? If your cholesterol cannot be managed through your diet, you may also need to take medicines. Take medicines as told by your doctor.  · Manage your diabetes.  ? It is important to eat a healthy diet and to exercise regularly.  ? If your blood sugar cannot be managed through diet and exercise, you may need to take medicines. Take medicines as told by your doctor.  · Control your high blood pressure (hypertension).  ? Try to keep your blood pressure below 130/80. This can help lower your risk of stroke.  ? It is important to eat a healthy diet and to exercise regularly.  ? If your blood pressure cannot be managed through diet and exercise, you may need to take medicines. Take medicines as told by your doctor.  ? Ask your doctor if you should check your blood pressure at home.  ? Have your blood pressure checked every year. Do this even if your blood pressure is normal.  · Talk to your doctor about getting checked for a sleep disorder. Signs of this can include:  ? Snoring a lot.  ? Feeling very tired.  · Take over-the-counter and prescription medicines only as told by your doctor. These may include aspirin or blood thinners (antiplatelets or anticoagulants).  · Make sure that any other  "medical conditions you have are managed.  Where to find more information  · American Stroke Association: www.strokeassociation.org  · National Stroke Association: www.stroke.org  Get help right away if:  · You have any symptoms of stroke. \"BE FAST\" is an easy way to remember the main warning signs:  ? B - Balance. Signs are dizziness, sudden trouble walking, or loss of balance.  ? E - Eyes. Signs are trouble seeing or a sudden change in how you see.  ? F - Face. Signs are sudden weakness or loss of feeling of the face, or the face or eyelid drooping on one side.  ? A - Arms. Signs are weakness or loss of feeling in an arm. This happens suddenly and usually on one side of the body.  ? S - Speech. Signs are sudden trouble speaking, slurred speech, or trouble understanding what people say.  ? T - Time. Time to call emergency services. Write down what time symptoms started.  · You have other signs of stroke, such as:  ? A sudden, very bad headache with no known cause.  ? Feeling sick to your stomach (nausea).  ? Throwing up (vomiting).  ? Jerky movements you cannot control (seizure).  These symptoms may represent a serious problem that is an emergency. Do not wait to see if the symptoms will go away. Get medical help right away. Call your local emergency services (911 in the [place] Do not drive yourself to the hospital.  Summary  · You can prevent a stroke by eating healthy, exercising, not smoking, drinking less alcohol, and treating other health problems, such as diabetes, high blood pressure, or high cholesterol.  · Do not use any products that contain nicotine or tobacco, such as cigarettes and e-cigarettes.  · Get help right away if you have any signs or symptoms of a stroke.  This information is not intended to replace advice given to you by your health care provider. Make sure you discuss any questions you have with your health care provider.  Document Released: 06/18/2013 Document Revised: 02/13/2020 Document " Reviewed: 03/21/2018  Elsevier Patient Education © 2020 [employer]

## 2020-11-23 ENCOUNTER — APPOINTMENT (RX ONLY)
Dept: URBAN - METROPOLITAN AREA CLINIC 36 | Facility: CLINIC | Age: 65
Setting detail: DERMATOLOGY
End: 2020-11-23

## 2020-11-23 PROBLEM — C44.311 BASAL CELL CARCINOMA OF SKIN OF NOSE: Status: ACTIVE | Noted: 2020-11-23

## 2020-11-23 PROCEDURE — ? PRESCRIPTION

## 2020-11-23 PROCEDURE — ? MOHS SURGERY

## 2020-11-23 PROCEDURE — 15740 ISLAND PEDICLE FLAP GRAFT: CPT

## 2020-11-23 PROCEDURE — 17311 MOHS 1 STAGE H/N/HF/G: CPT

## 2020-11-23 PROCEDURE — 17312 MOHS ADDL STAGE: CPT

## 2020-11-23 RX ORDER — HYDROCODONE BITARTRATE AND ACETAMINOPHEN 5; 325 MG/1; MG/1
1 TABLET ORAL
Qty: 20 | Refills: 0 | COMMUNITY
Start: 2020-11-23

## 2020-11-23 RX ADMIN — HYDROCODONE BITARTRATE AND ACETAMINOPHEN 1: 5; 325 TABLET ORAL at 00:00

## 2020-11-23 NOTE — PROCEDURE: MOHS SURGERY
Mohs Case Number: 
Biopsy Photograph Reviewed: Yes
Referring Physician (Optional): Stephanie LEE
Consent Type: Consent 1 (Standard)
Eye Shield Used: No
Surgeon Performing Repair (Optional): James
Initial Size Of Lesion: 1
Number Of Stages: 2
Primary Defect Length In Cm (Final Defect Size - Required For Flaps/Grafts): 2.4
Repair Type: Flap
Oculoplastic Surgeon (A): Alberto
Oculoplastic Surgeon Procedure Text (A): After obtaining clear surgical margins the patient was sent to oculoplastics for surgical repair.  The patient understands they will receive post-surgical care and follow-up from the referring physician's office.
Otolaryngologist Procedure Text (A): After obtaining clear surgical margins the patient was sent to otolaryngology for surgical repair.  The patient understands they will receive post-surgical care and follow-up from the referring physician's office.
Plastic Surgeon Procedure Text (A): After obtaining clear surgical margins the patient was sent to plastics for surgical repair.  The patient understands they will receive post-surgical care and follow-up from the referring physician's office.
Mid-Level Procedure Text (A): After obtaining clear surgical margins the patient was sent to a mid-level provider for surgical repair.  The patient understands they will receive post-surgical care and follow-up from the mid-level provider.
Provider Procedure Text (A): After obtaining clear surgical margins the defect was repaired by another provider.
Asc Procedure Text (A): After obtaining clear surgical margins the patient was sent to an ASC for surgical repair.  The patient understands they will receive post-surgical care and follow-up from the ASC physician.
Suturegard Retention Suture: 2-0 Nylon
Retention Suture Bite Size: 3 mm
Length To Time In Minutes Device Was In Place: 10
Simple / Intermediate / Complex Repair - Final Wound Length In Cm: 0
Undermining Type: Entire Wound
Debridement Text: The wound edges were debrided prior to proceeding with the closure to facilitate wound healing.
Helical Rim Text: The closure involved the helical rim.
Vermilion Border Text: The closure involved the vermilion border.
Nostril Rim Text: The closure involved the nostril rim.
Retention Suture Text: Retention sutures were placed to support the closure and prevent dehiscence.
Flap Type: Island Pedicle Flap- Requiring Identification and Dissection of an Anatomically Named Axial Vessel
Secondary Defect Length In Cm (Required For Flaps): 4.5
Name Of Vessel Dissected: angular artery
Area H Indication Text: Tumors in this location are included in Area H (eyelids, eyebrows, nose, lips, chin, ear, pre-auricular, post-auricular, temple, genitalia, hands, feet, ankles and areola).  Tissue conservation is critical in these anatomic locations.
Area M Indication Text: Tumors in this location are included in Area M (cheek, forehead, scalp, neck, jawline and pretibial skin).  Mohs surgery is indicated for tumors in these anatomic locations.
Area L Indication Text: Tumors in this location are included in Area L (trunk and extremities).  Mohs surgery is indicated for larger tumors, or tumors with aggressive histologic features, in these anatomic locations.
Surgical Defect Length In Cm (Optional): 1.9
Special Stains Stage 1 - Results: Base On Clearance Noted Above
Stage 2: Additional Anesthesia Type: 1% lidocaine with 1:100,000 epinephrine and 408mcg clindamycin/ml and a 1:10 solution of 8.4% sodium bicarbonate
Stage 4: Additional Anesthesia Type: 1% lidocaine with epinephrine
Include Tumor Staging In Mohs Note?: Please Select the Appropriate Response
Staging Info: By selecting yes to the question above you will include information on AJCC 8 tumor staging in your Mohs note. Information on tumor staging will be automatically added for SCCs on the head and neck. AJCC 8 includes tumor size, tumor depth, perineural involvement and bone invasion.
Tumor Depth: Less than 6mm from granular layer and no invasion beyond the subcutaneous fat
Medical Necessity Statement: Based on my medical judgement, Mohs surgery is the most appropriate treatment for this cancer compared to other treatments.
Alternatives Discussed Intro (Do Not Add Period): I discussed alternative treatments to Mohs surgery and specifically discussed the risks and benefits of
Consent 1/Introductory Paragraph: The rationale for Mohs was explained to the patient and consent was obtained. The risks, benefits and alternatives to therapy were discussed in detail. Specifically, the risks of infection, scarring, bleeding, prolonged wound healing, incomplete removal, allergy to anesthesia, nerve injury and recurrence were addressed. Prior to the procedure, the treatment site was clearly identified and confirmed by the patient. All components of Universal Protocol/PAUSE Rule completed.
Consent 2/Introductory Paragraph: Mohs surgery was explained to the patient and consent was obtained. The risks, benefits and alternatives to therapy were discussed in detail. Specifically, the risks of infection, scarring, bleeding, prolonged wound healing, incomplete removal, allergy to anesthesia, nerve injury and recurrence were addressed. Prior to the procedure, the treatment site was clearly identified and confirmed by the patient. All components of Universal Protocol/PAUSE Rule completed.
Consent 3/Introductory Paragraph: I gave the patient a chance to ask questions they had about the procedure.  Following this I explained the Mohs procedure and consent was obtained. The risks, benefits and alternatives to therapy were discussed in detail. Specifically, the risks of infection, scarring, bleeding, prolonged wound healing, incomplete removal, allergy to anesthesia, nerve injury and recurrence were addressed. Prior to the procedure, the treatment site was clearly identified and confirmed by the patient. All components of Universal Protocol/PAUSE Rule completed.
Consent (Temporal Branch)/Introductory Paragraph: The rationale for Mohs was explained to the patient and consent was obtained. The risks, benefits and alternatives to therapy were discussed in detail. Specifically, the risks of damage to the temporal branch of the facial nerve, infection, scarring, bleeding, prolonged wound healing, incomplete removal, allergy to anesthesia, and recurrence were addressed. Prior to the procedure, the treatment site was clearly identified and confirmed by the patient. All components of Universal Protocol/PAUSE Rule completed.
Consent (Marginal Mandibular)/Introductory Paragraph: The rationale for Mohs was explained to the patient and consent was obtained. The risks, benefits and alternatives to therapy were discussed in detail. Specifically, the risks of damage to the marginal mandibular branch of the facial nerve, infection, scarring, bleeding, prolonged wound healing, incomplete removal, allergy to anesthesia, and recurrence were addressed. Prior to the procedure, the treatment site was clearly identified and confirmed by the patient. All components of Universal Protocol/PAUSE Rule completed.
Consent (Spinal Accessory)/Introductory Paragraph: The rationale for Mohs was explained to the patient and consent was obtained. The risks, benefits and alternatives to therapy were discussed in detail. Specifically, the risks of damage to the spinal accessory nerve, infection, scarring, bleeding, prolonged wound healing, incomplete removal, allergy to anesthesia, and recurrence were addressed. Prior to the procedure, the treatment site was clearly identified and confirmed by the patient. All components of Universal Protocol/PAUSE Rule completed.
Consent (Near Eyelid Margin)/Introductory Paragraph: The rationale for Mohs was explained to the patient and consent was obtained. The risks, benefits and alternatives to therapy were discussed in detail. Specifically, the risks of ectropion or eyelid deformity, infection, scarring, bleeding, prolonged wound healing, incomplete removal, allergy to anesthesia, nerve injury and recurrence were addressed. Prior to the procedure, the treatment site was clearly identified and confirmed by the patient. All components of Universal Protocol/PAUSE Rule completed.
Consent (Ear)/Introductory Paragraph: The rationale for Mohs was explained to the patient and consent was obtained. The risks, benefits and alternatives to therapy were discussed in detail. Specifically, the risks of ear deformity, infection, scarring, bleeding, prolonged wound healing, incomplete removal, allergy to anesthesia, nerve injury and recurrence were addressed. Prior to the procedure, the treatment site was clearly identified and confirmed by the patient. All components of Universal Protocol/PAUSE Rule completed.
Consent (Nose)/Introductory Paragraph: The rationale for Mohs was explained to the patient and consent was obtained. The risks, benefits and alternatives to therapy were discussed in detail. Specifically, the risks of nasal deformity, changes in the flow of air through the nose, infection, scarring, bleeding, prolonged wound healing, incomplete removal, allergy to anesthesia, nerve injury and recurrence were addressed. Prior to the procedure, the treatment site was clearly identified and confirmed by the patient. All components of Universal Protocol/PAUSE Rule completed.
Consent (Lip)/Introductory Paragraph: The rationale for Mohs was explained to the patient and consent was obtained. The risks, benefits and alternatives to therapy were discussed in detail. Specifically, the risks of lip deformity, changes in the oral aperture, infection, scarring, bleeding, prolonged wound healing, incomplete removal, allergy to anesthesia, nerve injury and recurrence were addressed. Prior to the procedure, the treatment site was clearly identified and confirmed by the patient. All components of Universal Protocol/PAUSE Rule completed.
Consent (Scalp)/Introductory Paragraph: The rationale for Mohs was explained to the patient and consent was obtained. The risks, benefits and alternatives to therapy were discussed in detail. Specifically, the risks of changes in hair growth pattern secondary to repair, infection, scarring, bleeding, prolonged wound healing, incomplete removal, allergy to anesthesia, nerve injury and recurrence were addressed. Prior to the procedure, the treatment site was clearly identified and confirmed by the patient. All components of Universal Protocol/PAUSE Rule completed.
Detail Level: Detailed
Postop Diagnosis: same
Anesthesia Type: 0.5% lidocaine with 1:200,000 epinephrine and a 1:10 solution of 8.4% sodium bicarbonate and 408mcg clindamycin/ml
Anesthesia Volume In Cc: 2.9
Additional Anesthesia Volume In Cc: 6
Hemostasis: Electrocautery
Estimated Blood Loss (Cc): less than 5 cc
Repair Anesthesia Method: local infiltration
Deep Sutures: 5-0 Vicryl
Epidermal Sutures: 5-0 Ethilon
Epidermal Closure: running cuticular
Suturegard Intro: Intraoperative tissue expansion was performed, utilizing the SUTUREGARD device, in order to reduce wound tension.
Suturegard Body: The suture ends were repeatedly re-tightened and re-clamped to achieve the desired tissue expansion.
Hemigard Intro: Due to skin fragility and wound tension, it was decided to use HEMIGARD adhesive retention suture devices to permit a linear closure. The skin was cleaned and dried for a 6cm distance away from the wound. Excessive hair, if present, was removed to allow for adhesion.
Hemigard Postcare Instructions: The HEMIGARD strips are to remain completely dry for at least 5-7 days.
Donor Site Anesthesia Type: same as repair anesthesia
Graft Basting Suture (Optional): 5-0 Fast Absorbing Gut
Graft Donor Site Epidermal Sutures (Optional): 5-0 Ethibond
Epidermal Closure Graft Donor Site (Optional): simple interrupted
Graft Donor Site Bandage (Optional-Leave Blank If You Don't Want In Note): Aquaphor and telefa placed on wound. Pressure dressing applied to donor site
Closure 2 Information: This tab is for additional flaps and grafts, including complex repair and grafts and complex repair and flaps. You can also specify a different location for the additional defect, if the location is the same you do not need to select a new one. We will insert the automated text for the repair you select below just as we do for solitary flaps and grafts. Please note that at this time if you select a location with a different insurance zone you will need to override the ICD10 and CPT if appropriate.
Closure 3 Information: This tab is for additional flaps and grafts above and beyond our usual structured repairs.  Please note if you enter information here it will not currently bill and you will need to add the billing information manually.
Wound Care: Aquaphor
Dressing: dry sterile dressing
Wound Care (No Sutures): Petrolatum
Suture Removal: 7 days
Unna Boot Text: An Unna boot was placed to help immobilize the limb and facilitate more rapid healing.
Home Suture Removal Text: Patient was provided instructions on removing sutures and will remove their sutures at home.  If they have any questions or difficulties they will call the office.
Post-Care Instructions: I reviewed with the patient in detail post-care instructions. Patient is not to engage in any heavy lifting, exercise, or swimming for the next 14 days. Should the patient develop any fevers, chills, bleeding, severe pain patient will contact the office immediately.
Pain Refusal Text: I offered to prescribe pain medication but the patient refused to take this medication.
Mauc Instructions: By selecting yes to the question below the MAUC number will be added into the note.  This will be calculated automatically based on the diagnosis chosen, the size entered, the body zone selected (H,M,L) and the specific indications you chose. You will also have the option to override the Mohs AUC if you disagree with the automatically calculated number and this option is found in the Case Summary tab.
Where Do You Want The Question To Include Opioid Counseling Located?: Case Summary Tab
Eye Protection Verbiage: Before proceeding with the stage, a plastic scleral shield was inserted. The globe was anesthetized with a few drops of 1% lidocaine with 1:100,000 epinephrine. Then, an appropriate sized scleral shield was chosen and coated with lacrilube ointment. The shield was gently inserted and left in place for the duration of each stage. After the stage was completed, the shield was gently removed.
Mohs Method Verbiage: An incision at a 45 degree angle following the standard Mohs approach was done and the specimen was harvested as a microscopic controlled layer.
Surgeon/Pathologist Verbiage (Will Incorporate Name Of Surgeon From Intro If Not Blank): operated in two distinct and integrated capacities as the surgeon and pathologist.
Mohs Histo Method Verbiage: Each section was then chromacoded and processed in the Mohs lab using the Mohs protocol and submitted for frozen section.
Subsequent Stages Histo Method Verbiage: Using a similar technique to that described above, a thin layer of tissue was removed from all areas where tumor was visible on the previous stage.  The tissue was again oriented, mapped, dyed, and processed as above.
Mohs Rapid Report Verbiage: The area of clinically evident tumor was marked with skin marking ink and appropriately hatched.  The initial incision was made following the Mohs approach through the skin.  The specimen was taken to the lab, divided into the necessary number of pieces, chromacoded and processed according to the Mohs protocol.  This was repeated in successive stages until a tumor free defect was achieved.
Complex Repair Preamble Text (Leave Blank If You Do Not Want): Extensive wide undermining was performed at least 2 cm in all directions.
Intermediate Repair Preamble Text (Leave Blank If You Do Not Want): Undermining was performed with blunt dissection.
M-Plasty Complex Repair Preamble Text (Leave Blank If You Do Not Want): Extensive wide undermining was performed.
Non-Graft Cartilage Fenestration Text: The cartilage was fenestrated with a 2mm punch biopsy to help facilitate healing.
Graft Cartilage Fenestration Text: The cartilage was fenestrated with a 2mm punch biopsy to help facilitate graft survival and healing.
Secondary Intention Text (Leave Blank If You Do Not Want): The defect will heal with secondary intention.
No Repair - Repaired With Adjacent Surgical Defect Text (Leave Blank If You Do Not Want): After obtaining clear surgical margins the defect was repaired concurrently with another surgical defect which was in close approximation.
Advancement Flap (Single) Text: The defect edges were debeveled with a #15 scalpel blade.  Given the location of the defect and the proximity to free margins a single advancement flap was deemed most appropriate.  Using a sterile surgical marker, an appropriate advancement flap was drawn incorporating the defect and placing the expected incisions within the relaxed skin tension lines where possible.    The area thus outlined was incised deep to adipose tissue with a #15 scalpel blade.  The skin margins were undermined to an appropriate distance in all directions utilizing iris scissors.
Advancement Flap (Double) Text: The defect edges were debeveled with a #15 scalpel blade.  Given the location of the defect and the proximity to free margins a double advancement flap was deemed most appropriate.  Using a sterile surgical marker, the appropriate advancement flaps were drawn incorporating the defect and placing the expected incisions within the relaxed skin tension lines where possible.    The area thus outlined was incised deep to adipose tissue with a #15 scalpel blade.  The skin margins were undermined to an appropriate distance in all directions utilizing iris scissors.
Burow's Advancement Flap Text: The defect edges were debeveled with a #15 scalpel blade.  Given the location of the defect and the proximity to free margins a Burow's advancement flap was deemed most appropriate.  Using a sterile surgical marker, the appropriate advancement flap was drawn incorporating the defect and placing the expected incisions within the relaxed skin tension lines where possible.    The area thus outlined was incised deep to adipose tissue with a #15 scalpel blade.  The skin margins were undermined to an appropriate distance in all directions utilizing iris scissors.
Chonodrocutaneous Helical Advancement Flap Text: The defect edges were debeveled with a #15 scalpel blade.  Given the location of the defect and the proximity to free margins a chondrocutaneous helical advancement flap was deemed most appropriate.  Using a sterile surgical marker, the appropriate advancement flap was drawn incorporating the defect and placing the expected incisions within the relaxed skin tension lines where possible.    The area thus outlined was incised deep to adipose tissue with a #15 scalpel blade.  The skin margins were undermined to an appropriate distance in all directions utilizing iris scissors.
Crescentic Advancement Flap Text: The defect edges were debeveled with a #15 scalpel blade.  Given the location of the defect and the proximity to free margins a crescentic advancement flap was deemed most appropriate.  Using a sterile surgical marker, the appropriate advancement flap was drawn incorporating the defect and placing the expected incisions within the relaxed skin tension lines where possible.    The area thus outlined was incised deep to adipose tissue with a #15 scalpel blade.  The skin margins were undermined to an appropriate distance in all directions utilizing iris scissors.
A-T Advancement Flap Text: The defect edges were debeveled with a #15 scalpel blade.  Given the location of the defect, shape of the defect and the proximity to free margins an A-T advancement flap was deemed most appropriate.  Using a sterile surgical marker, an appropriate advancement flap was drawn incorporating the defect and placing the expected incisions within the relaxed skin tension lines where possible.    The area thus outlined was incised deep to adipose tissue with a #15 scalpel blade.  The skin margins were undermined to an appropriate distance in all directions utilizing iris scissors.
O-T Advancement Flap Text: The defect edges were debeveled with a #15 scalpel blade.  Given the location of the defect, shape of the defect and the proximity to free margins an O-T advancement flap was deemed most appropriate.  Using a sterile surgical marker, an appropriate advancement flap was drawn incorporating the defect and placing the expected incisions within the relaxed skin tension lines where possible.    The area thus outlined was incised deep to adipose tissue with a #15 scalpel blade.  The skin margins were undermined to an appropriate distance in all directions utilizing iris scissors.
O-L Flap Text: The defect edges were debeveled with a #15 scalpel blade.  Given the location of the defect, shape of the defect and the proximity to free margins an O-L flap was deemed most appropriate.  Using a sterile surgical marker, an appropriate advancement flap was drawn incorporating the defect and placing the expected incisions within the relaxed skin tension lines where possible.    The area thus outlined was incised deep to adipose tissue with a #15 scalpel blade.  The skin margins were undermined to an appropriate distance in all directions utilizing iris scissors.
O-Z Flap Text: The defect edges were debeveled with a #15 scalpel blade.  Given the location of the defect, shape of the defect and the proximity to free margins an O-Z flap was deemed most appropriate.  Using a sterile surgical marker, an appropriate transposition flap was drawn incorporating the defect and placing the expected incisions within the relaxed skin tension lines where possible. The area thus outlined was incised deep to adipose tissue with a #15 scalpel blade.  The skin margins were undermined to an appropriate distance in all directions utilizing iris scissors.
Double O-Z Flap Text: The defect edges were debeveled with a #15 scalpel blade.  Given the location of the defect, shape of the defect and the proximity to free margins a Double O-Z flap was deemed most appropriate.  Using a sterile surgical marker, an appropriate transposition flap was drawn incorporating the defect and placing the expected incisions within the relaxed skin tension lines where possible. The area thus outlined was incised deep to adipose tissue with a #15 scalpel blade.  The skin margins were undermined to an appropriate distance in all directions utilizing iris scissors.
V-Y Flap Text: The defect edges were debeveled with a #15 scalpel blade.  Given the location of the defect, shape of the defect and the proximity to free margins a V-Y flap was deemed most appropriate.  Using a sterile surgical marker, an appropriate advancement flap was drawn incorporating the defect and placing the expected incisions within the relaxed skin tension lines where possible.    The area thus outlined was incised deep to adipose tissue with a #15 scalpel blade.  The skin margins were undermined to an appropriate distance in all directions utilizing iris scissors.
Advancement-Rotation Flap Text: The defect edges were debeveled with a #15 scalpel blade.  Given the location of the defect, shape of the defect and the proximity to free margins an advancement-rotation flap was deemed most appropriate.  Using a sterile surgical marker, an appropriate flap was drawn incorporating the defect and placing the expected incisions within the relaxed skin tension lines where possible. The area thus outlined was incised deep to adipose tissue with a #15 scalpel blade.  The skin margins were undermined to an appropriate distance in all directions utilizing iris scissors.
Mercedes Flap Text: The defect edges were debeveled with a #15 scalpel blade.  Given the location of the defect, shape of the defect and the proximity to free margins a Mercedes flap was deemed most appropriate.  Using a sterile surgical marker, an appropriate advancement flap was drawn incorporating the defect and placing the expected incisions within the relaxed skin tension lines where possible. The area thus outlined was incised deep to adipose tissue with a #15 scalpel blade.  The skin margins were undermined to an appropriate distance in all directions utilizing iris scissors.
Modified Advancement Flap Text: The defect edges were debeveled with a #15 scalpel blade.  Given the location of the defect, shape of the defect and the proximity to free margins a modified advancement flap was deemed most appropriate.  Using a sterile surgical marker, an appropriate advancement flap was drawn incorporating the defect and placing the expected incisions within the relaxed skin tension lines where possible.    The area thus outlined was incised deep to adipose tissue with a #15 scalpel blade.  The skin margins were undermined to an appropriate distance in all directions utilizing iris scissors.
Mucosal Advancement Flap Text: Given the location of the defect, shape of the defect and the proximity to free margins a mucosal advancement flap was deemed most appropriate. Incisions were made with a 15 blade scalpel in the appropriate fashion along the cutaneous vermilion border and the mucosal lip. The remaining actinically damaged mucosal tissue was excised.  The mucosal advancement flap was then elevated to the gingival sulcus with care taken to preserve the neurovascular structures and advanced into the primary defect. Care was taken to ensure that precise realignment of the vermilion border was achieved.
Peng Advancement Flap Text: The defect edges were debeveled with a #15 scalpel blade.  Given the location of the defect, shape of the defect and the proximity to free margins a Peng advancement flap was deemed most appropriate.  Using a sterile surgical marker, an appropriate advancement flap was drawn incorporating the defect and placing the expected incisions within the relaxed skin tension lines where possible. The area thus outlined was incised deep to adipose tissue with a #15 scalpel blade.  The skin margins were undermined to an appropriate distance in all directions utilizing iris scissors.
Hatchet Flap Text: The defect edges were debeveled with a #15 scalpel blade.  Given the location of the defect, shape of the defect and the proximity to free margins a hatchet flap based from the glabella was deemed most appropriate.  Using a sterile surgical marker, an appropriate glabellar hatchet flap was drawn incorporating the defect and placing the expected incisions within the relaxed skin tension lines where possible.    The area thus outlined was incised deep to adipose tissue with a #15 scalpel blade.  The skin margins were undermined to an appropriate distance in all directions utilizing iris scissors.
Rotation Flap Text: The defect edges were debeveled with a #15 scalpel blade.  Given the location of the defect, shape of the defect and the proximity to free margins a rotation flap was deemed most appropriate.  Using a sterile surgical marker, an appropriate rotation flap was drawn incorporating the defect and placing the expected incisions within the relaxed skin tension lines where possible.    The area thus outlined was incised deep to adipose tissue with a #15 scalpel blade.  The skin margins were undermined to an appropriate distance in all directions utilizing iris scissors.
Spiral Flap Text: The defect edges were debeveled with a #15 scalpel blade.  Given the location of the defect, shape of the defect and the proximity to free margins a spiral flap was deemed most appropriate.  Using a sterile surgical marker, an appropriate rotation flap was drawn incorporating the defect and placing the expected incisions within the relaxed skin tension lines where possible. The area thus outlined was incised deep to adipose tissue with a #15 scalpel blade.  The skin margins were undermined to an appropriate distance in all directions utilizing iris scissors.
Star Wedge Flap Text: The defect edges were debeveled with a #15 scalpel blade.  Given the location of the defect, shape of the defect and the proximity to free margins a star wedge flap was deemed most appropriate.  Using a sterile surgical marker, an appropriate rotation flap was drawn incorporating the defect and placing the expected incisions within the relaxed skin tension lines where possible. The area thus outlined was incised deep to adipose tissue with a #15 scalpel blade.  The skin margins were undermined to an appropriate distance in all directions utilizing iris scissors.
Transposition Flap Text: The defect edges were debeveled with a #15 scalpel blade.  Given the location of the defect and the proximity to free margins a transposition flap was deemed most appropriate.  Using a sterile surgical marker, an appropriate transposition flap was drawn incorporating the defect.    The area thus outlined was incised deep to adipose tissue with a #15 scalpel blade.  The skin margins were undermined to an appropriate distance in all directions utilizing iris scissors.
Muscle Hinge Flap Text: The defect edges were debeveled with a #15 scalpel blade.  Given the size, depth and location of the defect and the proximity to free margins a muscle hinge flap was deemed most appropriate.  Using a sterile surgical marker, an appropriate hinge flap was drawn incorporating the defect. The area thus outlined was incised with a #15 scalpel blade.  The skin margins were undermined to an appropriate distance in all directions utilizing iris scissors.
Melolabial Transposition Flap Text: The defect edges were debeveled with a #15 scalpel blade.  Given the location of the defect and the proximity to free margins a melolabial flap was deemed most appropriate.  Using a sterile surgical marker, an appropriate melolabial transposition flap was drawn incorporating the defect.    The area thus outlined was incised deep to adipose tissue with a #15 scalpel blade.  The skin margins were undermined to an appropriate distance in all directions utilizing iris scissors.
Rhombic Flap Text: The defect edges were debeveled with a #15 scalpel blade.  Given the location of the defect and the proximity to free margins a rhombic flap was deemed most appropriate.  Using a sterile surgical marker, an appropriate rhombic flap was drawn incorporating the defect.    The area thus outlined was incised deep to adipose tissue with a #15 scalpel blade.  The skin margins were undermined to an appropriate distance in all directions utilizing iris scissors.
Rhomboid Transposition Flap Text: The defect edges were debeveled with a #15 scalpel blade.  Given the location of the defect and the proximity to free margins a rhomboid transposition flap was deemed most appropriate.  Using a sterile surgical marker, an appropriate rhomboid flap was drawn incorporating the defect.    The area thus outlined was incised deep to adipose tissue with a #15 scalpel blade.  The skin margins were undermined to an appropriate distance in all directions utilizing iris scissors.
Bi-Rhombic Flap Text: The defect edges were debeveled with a #15 scalpel blade.  Given the location of the defect and the proximity to free margins a bi-rhombic flap was deemed most appropriate.  Using a sterile surgical marker, an appropriate rhombic flap was drawn incorporating the defect. The area thus outlined was incised deep to adipose tissue with a #15 scalpel blade.  The skin margins were undermined to an appropriate distance in all directions utilizing iris scissors.
Helical Rim Advancement Flap Text: The defect edges were debeveled with a #15 blade scalpel.  Given the location of the defect and the proximity to free margins (helical rim) a double helical rim advancement flap was deemed most appropriate.  Using a sterile surgical marker, the appropriate advancement flaps were drawn incorporating the defect and placing the expected incisions between the helical rim and antihelix where possible.  The area thus outlined was incised through and through with a #15 scalpel blade.  With a skin hook and iris scissors, the flaps were gently and sharply undermined and freed up.
Bilateral Helical Rim Advancement Flap Text: The defect edges were debeveled with a #15 blade scalpel.  Given the location of the defect and the proximity to free margins (helical rim) a bilateral helical rim advancement flap was deemed most appropriate.  Using a sterile surgical marker, the appropriate advancement flaps were drawn incorporating the defect and placing the expected incisions between the helical rim and antihelix where possible.  The area thus outlined was incised through and through with a #15 scalpel blade.  With a skin hook and iris scissors, the flaps were gently and sharply undermined and freed up.
Ear Star Wedge Flap Text: The defect edges were debeveled with a #15 blade scalpel.  Given the location of the defect and the proximity to free margins (helical rim) an ear star wedge flap was deemed most appropriate.  Using a sterile surgical marker, the appropriate flap was drawn incorporating the defect and placing the expected incisions between the helical rim and antihelix where possible.  The area thus outlined was incised through and through with a #15 scalpel blade.
Banner Transposition Flap Text: The defect edges were debeveled with a #15 scalpel blade.  Given the location of the defect and the proximity to free margins a Banner transposition flap was deemed most appropriate.  Using a sterile surgical marker, an appropriate flap drawn around the defect. The area thus outlined was incised deep to adipose tissue with a #15 scalpel blade.  The skin margins were undermined to an appropriate distance in all directions utilizing iris scissors.
Bilobed Flap Text: The defect edges were debeveled with a #15 scalpel blade.  Given the location of the defect and the proximity to free margins a bilobe flap was deemed most appropriate.  Using a sterile surgical marker, an appropriate bilobe flap drawn around the defect.    The area thus outlined was incised deep to adipose tissue with a #15 scalpel blade.  The skin margins were undermined to an appropriate distance in all directions utilizing iris scissors.
Bilobed Transposition Flap Text: The defect edges were debeveled with a #15 scalpel blade.  Given the location of the defect and the proximity to free margins a bilobed transposition flap was deemed most appropriate.  Using a sterile surgical marker, an appropriate bilobe flap drawn around the defect.    The area thus outlined was incised deep to adipose tissue with a #15 scalpel blade.  The skin margins were undermined to an appropriate distance in all directions utilizing iris scissors.
Trilobed Flap Text: The defect edges were debeveled with a #15 scalpel blade.  Given the location of the defect and the proximity to free margins a trilobed flap was deemed most appropriate.  Using a sterile surgical marker, an appropriate trilobed flap drawn around the defect.    The area thus outlined was incised deep to adipose tissue with a #15 scalpel blade.  The skin margins were undermined to an appropriate distance in all directions utilizing iris scissors.
Dorsal Nasal Flap Text: The defect edges were debeveled with a #15 scalpel blade.  Given the location of the defect and the proximity to free margins a dorsal nasal flap,based upon the glabellar folds, was deemed most appropriate.  Using a sterile surgical marker, an appropriate dorsal nasal flap was drawn around the defect.    The area thus outlined was incised deep to adipose tissue with a #15 scalpel blade.  The skin margins were undermined to an appropriate distance in all directions utilizing iris scissors.
Island Pedicle Flap Text: The defect edges were debeveled with a #15 scalpel blade.  Given the location of the defect, shape of the defect and the proximity to free margins an island pedicle advancement flap was deemed most appropriate.  Using a sterile surgical marker, an appropriate advancement flap was drawn incorporating the defect, outlining the appropriate donor tissue and placing the expected incisions within the relaxed skin tension lines where possible.    The area thus outlined was incised deep to adipose tissue with a #15 scalpel blade.  The skin margins were undermined to an appropriate distance in all directions around the primary defect and laterally outward around the island pedicle utilizing iris scissors.  There was minimal undermining beneath the pedicle flap.
Island Pedicle Flap With Canthal Suspension Text: The defect edges were debeveled with a #15 scalpel blade.  Given the location of the defect, shape of the defect and the proximity to free margins an island pedicle advancement flap was deemed most appropriate.  Using a sterile surgical marker, an appropriate advancement flap was drawn incorporating the defect, outlining the appropriate donor tissue and placing the expected incisions within the relaxed skin tension lines where possible. The area thus outlined was incised deep to adipose tissue with a #15 scalpel blade.  The skin margins were undermined to an appropriate distance in all directions around the primary defect and laterally outward around the island pedicle utilizing iris scissors.  There was minimal undermining beneath the pedicle flap. A suspension suture was placed in the canthal tendon to prevent tension and prevent ectropion.
Alar Island Pedicle Flap Text: The defect edges were debeveled with a #15 scalpel blade.  Given the location of the defect, shape of the defect and the proximity to the alar rim an island pedicle advancement flap was deemed most appropriate.  Using a sterile surgical marker, an appropriate advancement flap was drawn incorporating the defect, outlining the appropriate donor tissue and placing the expected incisions within the nasal ala running parallel to the alar rim. The area thus outlined was incised with a #15 scalpel blade.  The skin margins were undermined minimally to an appropriate distance in all directions around the primary defect and laterally outward around the island pedicle utilizing iris scissors.  There was minimal undermining beneath the pedicle flap.
Double Island Pedicle Flap Text: The defect edges were debeveled with a #15 scalpel blade.  Given the location of the defect, shape of the defect and the proximity to free margins a double island pedicle advancement flap was deemed most appropriate.  Using a sterile surgical marker, an appropriate advancement flap was drawn incorporating the defect, outlining the appropriate donor tissue and placing the expected incisions within the relaxed skin tension lines where possible.    The area thus outlined was incised deep to adipose tissue with a #15 scalpel blade.  The skin margins were undermined to an appropriate distance in all directions around the primary defect and laterally outward around the island pedicle utilizing iris scissors.  There was minimal undermining beneath the pedicle flap.
Island Pedicle Flap-Requiring Vessel Identification Text: The defect edges were debeveled with a #15 scalpel blade.  Given the location of the defect, shape of the defect and the proximity to free margins an island pedicle advancement flap was deemed most appropriate.  Using a sterile surgical marker, an appropriate advancement flap was drawn, based on the axial vessel mentioned above, incorporating the defect, outlining the appropriate donor tissue and placing the expected incisions within the relaxed skin tension lines where possible.    The area thus outlined was incised deep to adipose tissue with a #15 scalpel blade.  The skin margins were undermined to an appropriate distance in all directions around the primary defect and laterally outward around the island pedicle utilizing iris scissors.  There was minimal undermining beneath the pedicle flap.
Keystone Flap Text: The defect edges were debeveled with a #15 scalpel blade.  Given the location of the defect, shape of the defect a keystone flap was deemed most appropriate.  Using a sterile surgical marker, an appropriate keystone flap was drawn incorporating the defect, outlining the appropriate donor tissue and placing the expected incisions within the relaxed skin tension lines where possible. The area thus outlined was incised deep to adipose tissue with a #15 scalpel blade.  The skin margins were undermined to an appropriate distance in all directions around the primary defect and laterally outward around the flap utilizing iris scissors.
O-T Plasty Text: The defect edges were debeveled with a #15 scalpel blade.  Given the location of the defect, shape of the defect and the proximity to free margins an O-T plasty was deemed most appropriate.  Using a sterile surgical marker, an appropriate O-T plasty was drawn incorporating the defect and placing the expected incisions within the relaxed skin tension lines where possible.    The area thus outlined was incised deep to adipose tissue with a #15 scalpel blade.  The skin margins were undermined to an appropriate distance in all directions utilizing iris scissors.
O-Z Plasty Text: The defect edges were debeveled with a #15 scalpel blade.  Given the location of the defect, shape of the defect and the proximity to free margins an O-Z plasty (double transposition flap) was deemed most appropriate.  Using a sterile surgical marker, the appropriate transposition flaps were drawn incorporating the defect and placing the expected incisions within the relaxed skin tension lines where possible.    The area thus outlined was incised deep to adipose tissue with a #15 scalpel blade.  The skin margins were undermined to an appropriate distance in all directions utilizing iris scissors.  Hemostasis was achieved with electrocautery.  The flaps were then transposed into place, one clockwise and the other counterclockwise, and anchored with interrupted buried subcutaneous sutures.
Double O-Z Plasty Text: The defect edges were debeveled with a #15 scalpel blade.  Given the location of the defect, shape of the defect and the proximity to free margins a Double O-Z plasty (double transposition flap) was deemed most appropriate.  Using a sterile surgical marker, the appropriate transposition flaps were drawn incorporating the defect and placing the expected incisions within the relaxed skin tension lines where possible. The area thus outlined was incised deep to adipose tissue with a #15 scalpel blade.  The skin margins were undermined to an appropriate distance in all directions utilizing iris scissors.  Hemostasis was achieved with electrocautery.  The flaps were then transposed into place, one clockwise and the other counterclockwise, and anchored with interrupted buried subcutaneous sutures.
V-Y Plasty Text: The defect edges were debeveled with a #15 scalpel blade.  Given the location of the defect, shape of the defect and the proximity to free margins an V-Y advancement flap was deemed most appropriate.  Using a sterile surgical marker, an appropriate advancement flap was drawn incorporating the defect and placing the expected incisions within the relaxed skin tension lines where possible.    The area thus outlined was incised deep to adipose tissue with a #15 scalpel blade.  The skin margins were undermined to an appropriate distance in all directions utilizing iris scissors.
H Plasty Text: Given the location of the defect, shape of the defect and the proximity to free margins a H-plasty was deemed most appropriate for repair.  Using a sterile surgical marker, the appropriate advancement arms of the H-plasty were drawn incorporating the defect and placing the expected incisions within the relaxed skin tension lines where possible. The area thus outlined was incised deep to adipose tissue with a #15 scalpel blade. The skin margins were undermined to an appropriate distance in all directions utilizing iris scissors.  The opposing advancement arms were then advanced into place in opposite direction and anchored with interrupted buried subcutaneous sutures.
W Plasty Text: The lesion was extirpated to the level of the fat with a #15 scalpel blade.  Given the location of the defect, shape of the defect and the proximity to free margins a W-plasty was deemed most appropriate for repair.  Using a sterile surgical marker, the appropriate transposition arms of the W-plasty were drawn incorporating the defect and placing the expected incisions within the relaxed skin tension lines where possible.    The area thus outlined was incised deep to adipose tissue with a #15 scalpel blade.  The skin margins were undermined to an appropriate distance in all directions utilizing iris scissors.  The opposing transposition arms were then transposed into place in opposite direction and anchored with interrupted buried subcutaneous sutures.
Z Plasty Text: The lesion was extirpated to the level of the fat with a #15 scalpel blade.  Given the location of the defect, shape of the defect and the proximity to free margins a Z-plasty was deemed most appropriate for repair.  Using a sterile surgical marker, the appropriate transposition arms of the Z-plasty were drawn incorporating the defect and placing the expected incisions within the relaxed skin tension lines where possible.    The area thus outlined was incised deep to adipose tissue with a #15 scalpel blade.  The skin margins were undermined to an appropriate distance in all directions utilizing iris scissors.  The opposing transposition arms were then transposed into place in opposite direction and anchored with interrupted buried subcutaneous sutures.
Zygomaticofacial Flap Text: Given the location of the defect, shape of the defect and the proximity to free margins a zygomaticofacial flap was deemed most appropriate for repair.  Using a sterile surgical marker, the appropriate flap was drawn incorporating the defect and placing the expected incisions within the relaxed skin tension lines where possible. The area thus outlined was incised deep to adipose tissue with a #15 scalpel blade with preservation of a vascular pedicle.  The skin margins were undermined to an appropriate distance in all directions utilizing iris scissors.  The flap was then placed into the defect and anchored with interrupted buried subcutaneous sutures.
Cheek Interpolation Flap Text: A decision was made to reconstruct the defect utilizing an interpolation axial flap and a staged reconstruction.  A telfa template was made of the defect.  This telfa template was then used to outline the Cheek Interpolation flap.  The donor area for the pedicle flap was then injected with anesthesia.  The flap was excised through the skin and subcutaneous tissue down to the layer of the underlying musculature.  The interpolation flap was carefully excised within this deep plane to maintain its blood supply.  The edges of the donor site were undermined.   The donor site was closed in a primary fashion.  The pedicle was then rotated into position and sutured.  Once the tube was sutured into place, adequate blood supply was confirmed with blanching and refill.  The pedicle was then wrapped with xeroform gauze and dressed appropriately with a telfa and gauze bandage to ensure continued blood supply and protect the attached pedicle.
Cheek-To-Nose Interpolation Flap Text: A decision was made to reconstruct the defect utilizing an interpolation axial flap and a staged reconstruction.  A telfa template was made of the defect.  This telfa template was then used to outline the Cheek-To-Nose Interpolation flap.  The donor area for the pedicle flap was then injected with anesthesia.  The flap was excised through the skin and subcutaneous tissue down to the layer of the underlying musculature.  The interpolation flap was carefully excised within this deep plane to maintain its blood supply.  The edges of the donor site were undermined.   The donor site was closed in a primary fashion.  The pedicle was then rotated into position and sutured.  Once the tube was sutured into place, adequate blood supply was confirmed with blanching and refill.  The pedicle was then wrapped with xeroform gauze and dressed appropriately with a telfa and gauze bandage to ensure continued blood supply and protect the attached pedicle.
Interpolation Flap Text: A decision was made to reconstruct the defect utilizing an interpolation axial flap and a staged reconstruction.  A telfa template was made of the defect.  This telfa template was then used to outline the interpolation flap.  The donor area for the pedicle flap was then injected with anesthesia.  The flap was excised through the skin and subcutaneous tissue down to the layer of the underlying musculature.  The interpolation flap was carefully excised within this deep plane to maintain its blood supply.  The edges of the donor site were undermined.   The donor site was closed in a primary fashion.  The pedicle was then rotated into position and sutured.  Once the tube was sutured into place, adequate blood supply was confirmed with blanching and refill.  The pedicle was then wrapped with xeroform gauze and dressed appropriately with a telfa and gauze bandage to ensure continued blood supply and protect the attached pedicle.
Melolabial Interpolation Flap Text: A decision was made to reconstruct the defect utilizing an interpolation axial flap and a staged reconstruction.  A telfa template was made of the defect.  This telfa template was then used to outline the melolabial interpolation flap.  The donor area for the pedicle flap was then injected with anesthesia.  The flap was excised through the skin and subcutaneous tissue down to the layer of the underlying musculature.  The pedicle flap was carefully excised within this deep plane to maintain its blood supply.  The edges of the donor site were undermined.   The donor site was closed in a primary fashion.  The pedicle was then rotated into position and sutured.  Once the tube was sutured into place, adequate blood supply was confirmed with blanching and refill.  The pedicle was then wrapped with xeroform gauze and dressed appropriately with a telfa and gauze bandage to ensure continued blood supply and protect the attached pedicle.
Mastoid Interpolation Flap Text: A decision was made to reconstruct the defect utilizing an interpolation axial flap and a staged reconstruction.  A telfa template was made of the defect.  This telfa template was then used to outline the mastoid interpolation flap.  The donor area for the pedicle flap was then injected with anesthesia.  The flap was excised through the skin and subcutaneous tissue down to the layer of the underlying musculature.  The pedicle flap was carefully excised within this deep plane to maintain its blood supply.  The edges of the donor site were undermined.   The donor site was closed in a primary fashion.  The pedicle was then rotated into position and sutured.  Once the tube was sutured into place, adequate blood supply was confirmed with blanching and refill.  The pedicle was then wrapped with xeroform gauze and dressed appropriately with a telfa and gauze bandage to ensure continued blood supply and protect the attached pedicle.
Posterior Auricular Interpolation Flap Text: A decision was made to reconstruct the defect utilizing an interpolation axial flap and a staged reconstruction.  A telfa template was made of the defect.  This telfa template was then used to outline the posterior auricular interpolation flap.  The donor area for the pedicle flap was then injected with anesthesia.  The flap was excised through the skin and subcutaneous tissue down to the layer of the underlying musculature.  The pedicle flap was carefully excised within this deep plane to maintain its blood supply.  The edges of the donor site were undermined.   The donor site was closed in a primary fashion.  The pedicle was then rotated into position and sutured.  Once the tube was sutured into place, adequate blood supply was confirmed with blanching and refill.  The pedicle was then wrapped with xeroform gauze and dressed appropriately with a telfa and gauze bandage to ensure continued blood supply and protect the attached pedicle.
Paramedian Forehead Flap Text: A decision was made to reconstruct the defect utilizing an interpolation axial flap and a staged reconstruction.  A telfa template was made of the defect.  This telfa template was then used to outline the paramedian forehead pedicle flap.  The donor area for the pedicle flap was then injected with anesthesia.  The flap was excised through the skin and subcutaneous tissue down to the layer of the underlying musculature.  The pedicle flap was carefully excised within this deep plane to maintain its blood supply.  The edges of the donor site were undermined.   The donor site was closed in a primary fashion.  The pedicle was then rotated into position and sutured.  Once the tube was sutured into place, adequate blood supply was confirmed with blanching and refill.  The pedicle was then wrapped with xeroform gauze and dressed appropriately with a telfa and gauze bandage to ensure continued blood supply and protect the attached pedicle.
Cheiloplasty (Less Than 50%) Text: A decision was made to reconstruct the defect with a  cheiloplasty.  The defect was undermined extensively.  Additional obicularis oris muscle was excised with a 15 blade scalpel.  The defect was converted into a full thickness wedge, of less than 50% of the vertical height of the lip, to facilite a better cosmetic result.  Small vessels were then tied off with 5-0 monocyrl. The obicularis oris, superficial fascia, adipose and dermis were then reapproximated.  After the deeper layers were approximated the epidermis was reapproximated with particular care given to realign the vermilion border.
Cheiloplasty (Complex) Text: A decision was made to reconstruct the defect with a  cheiloplasty.  The defect was undermined extensively.  Additional obicularis oris muscle was excised with a 15 blade scalpel.  The defect was converted into a full thickness wedge to facilite a better cosmetic result.  Small vessels were then tied off with 5-0 monocyrl. The obicularis oris, superficial fascia, adipose and dermis were then reapproximated.  After the deeper layers were approximated the epidermis was reapproximated with particular care given to realign the vermilion border.
Ear Wedge Repair Text: A wedge excision was completed by carrying down an excision through the full thickness of the ear and cartilage with an inward facing Burow's triangle. The wound was then closed in a layered fashion.
Full Thickness Lip Wedge Repair (Flap) Text: Given the location of the defect and the proximity to free margins a full thickness wedge repair was deemed most appropriate.  Using a sterile surgical marker, the appropriate repair was drawn incorporating the defect and placing the expected incisions perpendicular to the vermilion border.  The vermilion border was also meticulously outlined to ensure appropriate reapproximation during the repair.  The area thus outlined was incised through and through with a #15 scalpel blade.  The muscularis and dermis were reaproximated with deep sutures following hemostasis. Care was taken to realign the vermilion border before proceeding with the superficial closure.  Once the vermilion was realigned the superfical and mucosal closure was finished.
Ftsg Text: The defect edges were debeveled with a #15 scalpel blade.  Given the location of the defect, shape of the defect and the proximity to free margins a full thickness skin graft was deemed most appropriate.  Using a sterile surgical marker, the primary defect shape was transferred to the donor site. The area thus outlined was incised deep to adipose tissue with a #15 scalpel blade.  The harvested graft was then trimmed of adipose tissue until only dermis and epidermis was left.  The skin margins of the secondary defect were undermined to an appropriate distance in all directions utilizing iris scissors.  The secondary defect was closed with interrupted buried subcutaneous sutures.  The skin edges were then re-apposed with running  sutures.  The skin graft was then placed in the primary defect and oriented appropriately.
Split-Thickness Skin Graft Text: The defect edges were debeveled with a #15 scalpel blade.  Given the location of the defect, shape of the defect and the proximity to free margins a split thickness skin graft was deemed most appropriate.  Using a sterile surgical marker, the primary defect shape was transferred to the donor site. The split thickness graft was then harvested.  The skin graft was then placed in the primary defect and oriented appropriately.
Burow's Graft Text: The defect edges were debeveled with a #15 scalpel blade.  Given the location of the defect, shape of the defect, the proximity to free margins and the presence of a standing cone deformity a Burow's skin graft was deemed most appropriate. The standing cone was removed and this tissue was then trimmed to the shape of the primary defect. The adipose tissue was also removed until only dermis and epidermis were left.  The skin margins of the secondary defect were undermined to an appropriate distance in all directions utilizing iris scissors.  The secondary defect was closed with interrupted buried subcutaneous sutures.  The skin edges were then re-apposed with running  sutures.  The skin graft was then placed in the primary defect and oriented appropriately.
Cartilage Graft Text: The defect edges were debeveled with a #15 scalpel blade.  Given the location of the defect, shape of the defect, the fact the defect involved a full thickness cartilage defect a cartilage graft was deemed most appropriate.  An appropriate donor site was identified, cleansed, and anesthetized. The cartilage graft was then harvested and transferred to the recipient site, oriented appropriately and then sutured into place.  The secondary defect was then repaired using a primary closure.
Composite Graft Text: The defect edges were debeveled with a #15 scalpel blade.  Given the location of the defect, shape of the defect, the proximity to free margins and the fact the defect was full thickness a composite graft was deemed most appropriate.  The defect was outline and then transferred to the donor site.  A full thickness graft was then excised from the donor site. The graft was then placed in the primary defect, oriented appropriately and then sutured into place.  The secondary defect was then repaired using a primary closure.
Epidermal Autograft Text: The defect edges were debeveled with a #15 scalpel blade.  Given the location of the defect, shape of the defect and the proximity to free margins an epidermal autograft was deemed most appropriate.  Using a sterile surgical marker, the primary defect shape was transferred to the donor site. The epidermal graft was then harvested.  The skin graft was then placed in the primary defect and oriented appropriately.
Dermal Autograft Text: The defect edges were debeveled with a #15 scalpel blade.  Given the location of the defect, shape of the defect and the proximity to free margins a dermal autograft was deemed most appropriate.  Using a sterile surgical marker, the primary defect shape was transferred to the donor site. The area thus outlined was incised deep to adipose tissue with a #15 scalpel blade.  The harvested graft was then trimmed of adipose and epidermal tissue until only dermis was left.  The skin graft was then placed in the primary defect and oriented appropriately.
Skin Substitute Text: The defect edges were debeveled with a #15 scalpel blade.  Given the location of the defect, shape of the defect and the proximity to free margins a skin substitute graft was deemed most appropriate.  The graft material was trimmed to fit the size of the defect. The graft was then placed in the primary defect and oriented appropriately.
Tissue Cultured Epidermal Autograft Text: The defect edges were debeveled with a #15 scalpel blade.  Given the location of the defect, shape of the defect and the proximity to free margins a tissue cultured epidermal autograft was deemed most appropriate.  The graft was then trimmed to fit the size of the defect.  The graft was then placed in the primary defect and oriented appropriately.
Xenograft Text: The defect edges were debeveled with a #15 scalpel blade.  Given the location of the defect, shape of the defect and the proximity to free margins a xenograft was deemed most appropriate.  The graft was then trimmed to fit the size of the defect.  The graft was then placed in the primary defect and oriented appropriately.
Purse String (Simple) Text: Given the location of the defect and the characteristics of the surrounding skin a purse string closure was deemed most appropriate.  Undermining was performed circumfirentially around the surgical defect.  A purse string suture was then placed and tightened.
Purse String (Intermediate) Text: Given the location of the defect and the characteristics of the surrounding skin a purse string intermediate closure was deemed most appropriate.  Undermining was performed circumfirentially around the surgical defect.  A purse string suture was then placed and tightened.
Partial Purse String (Simple) Text: Given the location of the defect and the characteristics of the surrounding skin a simple purse string closure was deemed most appropriate.  Undermining was performed circumfirentially around the surgical defect.  A purse string suture was then placed and tightened. Wound tension only allowed a partial closure of the circular defect.
Partial Purse String (Intermediate) Text: Given the location of the defect and the characteristics of the surrounding skin an intermediate purse string closure was deemed most appropriate.  Undermining was performed circumfirentially around the surgical defect.  A purse string suture was then placed and tightened. Wound tension only allowed a partial closure of the circular defect.
Localized Dermabrasion With Wire Brush Text: The patient was draped in routine manner.  Localized dermabrasion using 3 x 17 mm wire brush was performed in routine manner to papillary dermis. This spot dermabrasion is being performed to complete skin cancer reconstruction. It also will eliminate the other sun damaged precancerous cells that are known to be part of the regional effect of a lifetime's worth of sun exposure. This localized dermabrasion is therapeutic and should not be considered cosmetic in any regard.
Tarsorrhaphy Text: A tarsorrhaphy was performed using Frost sutures.
Complex Repair And Flap Additional Text (Will Appearing After The Standard Complex Repair Text): The complex repair was not sufficient to completely close the primary defect. The remaining additional defect was repaired with the flap mentioned below.
Complex Repair And Graft Additional Text (Will Appearing After The Standard Complex Repair Text): The complex repair was not sufficient to completely close the primary defect. The remaining additional defect was repaired with the graft mentioned below.
Unique Flap 1 Name: Myocutaneous Island pedicle Flap
Unique Flap 2 Name: Peng Flap
Unique Flap 3 Name: Mercedes Flap
Unique Flap 4 Name: Banner Flap
Unique Flap 5 Name: tunneled myocutaneous flap
Unique Flap 1 Text: A decision was made to reconstruct the defect utilizing a myocutaneous Island pedicle Flap based on the levator labii superioris muscle.  A telfa template was made of the defect.  This telfa template was then used to outline the myocutaneous flap, based along the meilolabial fold.  The donor area for the pedicle flap was then injected with anesthesia.  The flap was excised through the skin and subcutaneous tissue down to the layer of the underlying musculature.  The myocutaneous flap was carefully excised within this deep plane to maintain its blood supply. Based on the muscle. The edges of the donor site were undermined.   The donor site was closed in a primary fashion to the point of transposition.  The pedicle was then transposed into position and sutured.  Once the flap was sutured into place, adequate blood supply was confirmed with blanching and refill.
Unique Flap 2 Text: A decision was made to reconstruct the defect utilizing a Peng Flap (Bilateral Advancement Rotation Flap). Given the location of the defect and the proximity to free margins, this flap was deemed most appropriate.  Using a sterile surgical marker, the appropriate rotation flaps were drawn incorporating the defect and placing the expected incisions within the relaxed skin tension lines where possible.    The area thus outlined was incised deep to adipose tissue with a #15 scalpel blade.  The skin margins were undermined to an appropriate distance in all directions utilizing iris scissors.
Unique Flap 3 Text: The defect edges were debeveled with a #15 scalpel blade.  Given the location of the defect, shape of the defect and the proximity to free margins a Mercedes (double advancement flap) was deemed most appropriate.  Using a sterile surgical marker, the appropriate transposition flaps were drawn incorporating the defect and placing the expected incisions within the relaxed skin tension lines where possible.    The area thus outlined was incised deep to adipose tissue with a #15 scalpel blade.  The skin margins were undermined to an appropriate distance in all directions utilizing iris scissors.  Hemostasis was achieved with electrocautery.  The flaps were then advanced into the defect and anchored with interrupted buried subcutaneous sutures.
Unique Flap 4 Text: The defect edges were debeveled with a #15 scalpel blade.  Given the location of the defect and the proximity to free margins a Banner transposition flap was deemed most appropriate.  Using a sterile surgical marker, an appropriate Banner transposition flap was drawn incorporating the defect.    The area thus outlined was incised deep to adipose tissue with a #15 scalpel blade.  The skin margins were undermined to an appropriate distance in all directions utilizing iris scissors.
Unique Flap 5 Text: A decision was made to reconstruct the defect utilizing a tunneled myocutaneous Island pedicle Flap based on the anterior auricularis muscle.  A telfa template was made of the defect.  This telfa template was then used to outline the myocutaneous flap, based along the preauricular fold.  The donor area for the pedicle flap was then injected with anesthesia.  The flap was excised through the skin and subcutaneous tissue down to the layer of the underlying musculature.  The myocutaneous flap was carefully excised within this deep plane to maintain its blood supply based on the muscle. The edges of the donor site were undermined.   The donor site was closed in a primary fashion to the point of transposition.  The pedicle was then transposed through a tunnel into position and sutured.  Once the flap was sutured into place, adequate blood supply was confirmed with blanching and refill.
Manual Repair Warning Statement: We plan on removing the manually selected variable below in favor of our much easier automatic structured text blocks found in the previous tab. We decided to do this to help make the flow better and give you the full power of structured data. Manual selection is never going to be ideal in our platform and I would encourage you to avoid using manual selection from this point on, especially since I will be sunsetting this feature. It is important that you do one of two things with the customized text below. First, you can save all of the text in a word file so you can have it for future reference. Second, transfer the text to the appropriate area in the Library tab. Lastly, if there is a flap or graft type which we do not have you need to let us know right away so I can add it in before the variable is hidden. No need to panic, we plan to give you roughly 6 months to make the change.
Same Histology In Subsequent Stages Text: The pattern and morphology of the tumor is as described in the first stage.
No Residual Tumor Seen Histology Text: There were no malignant cells seen in the sections examined.
Inflammation Suggestive Of Cancer Camouflage Histology Text: There was a dense lymphocytic infiltrate which prevented adequate histologic evaluation of adjacent structures.
Bcc Histology Text: There were numerous aggregates of basaloid cells.
Bcc Infiltrative Histology Text: There were numerous aggregates of basaloid cells demonstrating an infiltrative pattern.
Mart-1 - Positive Histology Text: MART-1 staining demonstrates areas of higher density and clustering of melanocytes with Pagetoid spread upwards within the epidermis. The surgical margins are positive for tumor cells.
Mart-1 - Negative Histology Text: MART-1 staining demonstrates a normal density and pattern of melanocytes along the dermal-epidermal junction. The surgical margins are negative for tumor cells.
Information: Selecting Yes will display possible errors in your note based on the variables you have selected. This validation is only offered as a suggestion for you. PLEASE NOTE THAT THE VALIDATION TEXT WILL BE REMOVED WHEN YOU FINALIZE YOUR NOTE. IF YOU WANT TO FAX A PRELIMINARY NOTE YOU WILL NEED TO TOGGLE THIS TO 'NO' IF YOU DO NOT WANT IT IN YOUR FAXED NOTE.

## 2020-11-24 ENCOUNTER — APPOINTMENT (RX ONLY)
Dept: URBAN - METROPOLITAN AREA CLINIC 36 | Facility: CLINIC | Age: 65
Setting detail: DERMATOLOGY
End: 2020-11-24

## 2020-11-30 ENCOUNTER — APPOINTMENT (RX ONLY)
Dept: URBAN - METROPOLITAN AREA CLINIC 36 | Facility: CLINIC | Age: 65
Setting detail: DERMATOLOGY
End: 2020-11-30

## 2020-11-30 DIAGNOSIS — Z48.02 ENCOUNTER FOR REMOVAL OF SUTURES: ICD-10-CM

## 2020-11-30 PROCEDURE — ? SUTURE REMOVAL (GLOBAL PERIOD)

## 2020-11-30 PROCEDURE — 99024 POSTOP FOLLOW-UP VISIT: CPT

## 2020-11-30 ASSESSMENT — LOCATION SIMPLE DESCRIPTION DERM: LOCATION SIMPLE: RIGHT NOSE

## 2020-11-30 ASSESSMENT — LOCATION DETAILED DESCRIPTION DERM: LOCATION DETAILED: RIGHT NASAL ALA

## 2020-11-30 ASSESSMENT — LOCATION ZONE DERM: LOCATION ZONE: NOSE

## 2020-11-30 NOTE — PROCEDURE: SUTURE REMOVAL (GLOBAL PERIOD)
Detail Level: Detailed
Add 63908 Cpt? (Important Note: In 2017 The Use Of 26874 Is Being Tracked By Cms To Determine Future Global Period Reimbursement For Global Periods): yes

## 2020-12-03 ENCOUNTER — HOSPITAL ENCOUNTER (OUTPATIENT)
Dept: CARDIOLOGY | Facility: MEDICAL CENTER | Age: 65
End: 2020-12-03
Attending: INTERNAL MEDICINE
Payer: MEDICARE

## 2020-12-03 DIAGNOSIS — I50.22 ACC/AHA STAGE C CHRONIC SYSTOLIC HEART FAILURE (HCC): ICD-10-CM

## 2020-12-03 LAB — LV EJECT FRACT MOD 4C 99902: 47.77

## 2020-12-03 PROCEDURE — 93308 TTE F-UP OR LMTD: CPT | Mod: 26 | Performed by: INTERNAL MEDICINE

## 2020-12-03 PROCEDURE — 93308 TTE F-UP OR LMTD: CPT

## 2020-12-08 ENCOUNTER — APPOINTMENT (RX ONLY)
Dept: URBAN - METROPOLITAN AREA CLINIC 36 | Facility: CLINIC | Age: 65
Setting detail: DERMATOLOGY
End: 2020-12-08

## 2020-12-08 DIAGNOSIS — L90.5 SCAR CONDITIONS AND FIBROSIS OF SKIN: ICD-10-CM

## 2020-12-08 PROCEDURE — ? INTRALESIONAL KENALOG

## 2020-12-08 ASSESSMENT — LOCATION DETAILED DESCRIPTION DERM: LOCATION DETAILED: RIGHT NASAL ALA

## 2020-12-08 ASSESSMENT — LOCATION SIMPLE DESCRIPTION DERM: LOCATION SIMPLE: RIGHT NOSE

## 2020-12-08 ASSESSMENT — LOCATION ZONE DERM: LOCATION ZONE: NOSE

## 2020-12-08 NOTE — PROCEDURE: INTRALESIONAL KENALOG
Treatment Number (Optional): 1
Total Volume (Ccs): 0.2
Medical Necessity Clause: This procedure was medically necessary because the lesions that were treated were: draining
Detail Level: Detailed
Concentration Of Kenalog Solution Injected (Mg/Ml): 40.0
Include Z78.9 (Other Specified Conditions Influencing Health Status) As An Associated Diagnosis?: No
Kenalog Preparation: Kenalog
X Size Of Lesion In Cm (Optional): 0
Consent: The risks of atrophy were reviewed with the patient.

## 2020-12-14 ENCOUNTER — OFFICE VISIT (OUTPATIENT)
Dept: CARDIOLOGY | Facility: MEDICAL CENTER | Age: 65
End: 2020-12-14
Payer: MEDICARE

## 2020-12-14 VITALS
OXYGEN SATURATION: 96 % | HEART RATE: 72 BPM | BODY MASS INDEX: 30.45 KG/M2 | DIASTOLIC BLOOD PRESSURE: 68 MMHG | WEIGHT: 224.8 LBS | HEIGHT: 72 IN | SYSTOLIC BLOOD PRESSURE: 100 MMHG | RESPIRATION RATE: 12 BRPM

## 2020-12-14 DIAGNOSIS — Z79.899 HIGH RISK MEDICATION USE: ICD-10-CM

## 2020-12-14 DIAGNOSIS — I50.22 ACC/AHA STAGE C CHRONIC SYSTOLIC HEART FAILURE (HCC): ICD-10-CM

## 2020-12-14 DIAGNOSIS — I10 ESSENTIAL HYPERTENSION: ICD-10-CM

## 2020-12-14 PROCEDURE — 99214 OFFICE O/P EST MOD 30 MIN: CPT | Performed by: INTERNAL MEDICINE

## 2020-12-14 RX ORDER — SPIRONOLACTONE 25 MG/1
25 TABLET ORAL DAILY
Qty: 1 TAB | Refills: 1 | Status: SHIPPED | OUTPATIENT
Start: 2020-12-14

## 2020-12-14 RX ORDER — CARVEDILOL 25 MG/1
25 TABLET ORAL 2 TIMES DAILY WITH MEALS
Qty: 180 TAB | Refills: 3 | Status: SHIPPED | OUTPATIENT
Start: 2020-12-14

## 2020-12-14 RX ORDER — LISINOPRIL 40 MG/1
40 TABLET ORAL DAILY
Qty: 90 TAB | Refills: 3 | Status: SHIPPED | OUTPATIENT
Start: 2020-12-14

## 2020-12-14 RX ORDER — ATORVASTATIN CALCIUM 80 MG/1
80 TABLET, FILM COATED ORAL EVERY EVENING
Qty: 90 TAB | Refills: 3 | Status: SHIPPED | OUTPATIENT
Start: 2020-12-14

## 2020-12-14 ASSESSMENT — ENCOUNTER SYMPTOMS
DIZZINESS: 0
PALPITATIONS: 0
ABDOMINAL PAIN: 0
HEADACHES: 0
WEAKNESS: 0
SYNCOPE: 0
DIAPHORESIS: 0
SHORTNESS OF BREATH: 0
DECREASED APPETITE: 0
NUMBNESS: 0
NAUSEA: 0
PARESTHESIAS: 0
ORTHOPNEA: 0
LIGHT-HEADEDNESS: 1
IRREGULAR HEARTBEAT: 0
VOMITING: 0
SLEEP DISTURBANCES DUE TO BREATHING: 0
WHEEZING: 0

## 2020-12-14 ASSESSMENT — FIBROSIS 4 INDEX: FIB4 SCORE: 1.4

## 2020-12-14 NOTE — PROGRESS NOTES
"      Cardiology Follow-up Consultation Note    Date of note:    12/14/2020    Primary Care Provider: Kp Rivera M.D.    Name:             Ze Wan   YOB: 1955  MRN:               [id number]    Chief Complaint   Patient presents with   • Congestive Heart Failure     follow up       HISTORY OF PRESENT ILLNESS  Mr. Ze Wan is a 65 y.o. male who returns to see us for follow-up of systolic heart failure and hypertension.    Last clinic visit: 10/28/2020    Interim History:  Patient reports doing well since our last visit.  Has no acute cardiovascular complaints.    For hypertension, states that his average blood pressure at home is 110s/70s.  Blood pressure in clinic today is 100/68 and reports feeling slightly lightheaded.  Is currently lisinopril 40 mg, carvedilol 25 mg twice daily and spironolactone 50 mg daily.      Review of Systems   Constitution: Negative for decreased appetite, diaphoresis and malaise/fatigue.   Cardiovascular: Negative for chest pain, irregular heartbeat, leg swelling, orthopnea, palpitations and syncope.   Respiratory: Negative for shortness of breath, sleep disturbances due to breathing and wheezing.    Gastrointestinal: Negative for abdominal pain, nausea and vomiting.   Neurological: Positive for light-headedness. Negative for dizziness, headaches, numbness, paresthesias and weakness.         Past Medical History:   Diagnosis Date   • Breath shortness     With mild exertion    • Dry cough     PE in 2008 \"dry cough since then\".   • High cholesterol    • Hypertension    • Pulmonary embolism (HCC) 2008   • Stroke (HCC) 09/08/2020    Unable to speak.         No past surgical history on file.      Current Outpatient Medications   Medication Sig Dispense Refill   • atorvastatin (LIPITOR) 80 MG tablet Take 1 Tab by mouth every evening. 90 Tab 3   • carvedilol (COREG) 25 MG Tab Take 1 Tab by mouth 2 times a day, with meals. 180 Tab 3   • lisinopril " (PRINIVIL) 40 MG tablet Take 1 Tab by mouth every day. 90 Tab 3   • spironolactone (ALDACTONE) 25 MG Tab Take 1 Tab by mouth every day. 1 Tab 1   • aspirin (ASPIRIN ADULT LOW DOSE) 81 MG EC tablet Take 1 Tab by mouth every day. 90 Tab 3     No current facility-administered medications for this visit.          Allergies   Allergen Reactions   • Shellfish Allergy Hives         No family history on file.      Social History     Socioeconomic History   • Marital status:      Spouse name: Not on file   • Number of children: Not on file   • Years of education: Not on file   • Highest education level: Not on file   Occupational History   • Not on file   Social Needs   • Financial resource strain: Not on file   • Food insecurity     Worry: Not on file     Inability: Not on file   • Transportation needs     Medical: Not on file     Non-medical: Not on file   Tobacco Use   • Smoking status: Never Smoker   • Smokeless tobacco: Never Used   Substance and Sexual Activity   • Alcohol use: Not Currently     Frequency: Never     Binge frequency: Never   • Drug use: Never   • Sexual activity: Not on file   Lifestyle   • Physical activity     Days per week: Not on file     Minutes per session: Not on file   • Stress: Not on file   Relationships   • Social connections     Talks on phone: Not on file     Gets together: Not on file     Attends Confucianist service: Not on file     Active member of club or organization: Not on file     Attends meetings of clubs or organizations: Not on file     Relationship status: Not on file   • Intimate partner violence     Fear of current or ex partner: Not on file     Emotionally abused: Not on file     Physically abused: Not on file     Forced sexual activity: Not on file   Other Topics Concern   • Not on file   Social History Narrative   • Not on file         Physical Exam:  Ambulatory Vitals  /68 (BP Location: Right arm, Patient Position: Sitting, BP Cuff Size: Adult)   Pulse 72    Resp 12   Ht 1.829 m (6')   Wt 102 kg (224 lb 12.8 oz)   SpO2 96%    Oxygen Therapy:  Pulse Oximetry: 96 %  BP Readings from Last 4 Encounters:   12/14/20 100/68   11/16/20 119/65   10/28/20 118/62   09/28/20 136/74       Weight/BMI: Body mass index is 30.49 kg/m².  Wt Readings from Last 4 Encounters:   12/14/20 102 kg (224 lb 12.8 oz)   11/16/20 102.1 kg (225 lb)   10/28/20 105.7 kg (233 lb)   09/28/20 107.5 kg (237 lb)       GEN: Well developed, well nourished and in no acute distress.  HEART: no significant JVD, regular rate and rhythm, normal S1 and S2, no murmurs, no third heart sounds, normal cardiac palpation  LUNG: clear to auscultation bilaterally, no wheezing, no crackles, normal respiratory effort on room air  ABDOMEN: soft, non-tender, non-distended, normal bowel sounds throughout  EXTREMITIES: no peripheral edema noted  VASCULAR: no significantly elevated jugular venous pressure, no carotid bruits noted, radial pulses 2+ and equal      Lab Data Review:  Lab Results   Component Value Date/Time    CHOLSTRLTOT 234 (H) 09/09/2020 04:32 AM     (H) 09/09/2020 04:32 AM    HDL 36 (A) 09/09/2020 04:32 AM    TRIGLYCERIDE 160 (H) 09/09/2020 04:32 AM       Lab Results   Component Value Date/Time    SODIUM 139 09/24/2020 09:30 AM    POTASSIUM 3.8 09/24/2020 09:30 AM    CHLORIDE 102 09/24/2020 09:30 AM    CO2 22 09/24/2020 09:30 AM    GLUCOSE 116 (H) 09/24/2020 09:30 AM    BUN 20 09/24/2020 09:30 AM    CREATININE 0.95 09/24/2020 09:30 AM     Lab Results   Component Value Date/Time    ALKPHOSPHAT 91 09/23/2020 02:06 PM    ASTSGOT 28 09/23/2020 02:06 PM    ALTSGPT 38 09/23/2020 02:06 PM    TBILIRUBIN 0.9 09/23/2020 02:06 PM      Lab Results   Component Value Date/Time    WBC 12.4 (H) 09/23/2020 02:06 PM     Lab Results   Component Value Date/Time    HBA1C 5.7 (H) 09/08/2020 11:48 AM       Cardiac Imaging and Procedures Review:    Echo dated 12/3/2020:   Reviewed personally, left ventricle ejection fraction  has improved to 45%.    [facility] (9/25/2020):   CONCLUSIONS:  1.  Mild nonobstructive CAD  2.  LVEDP 8 mmHg  3.  Nonischemic cardiomyopathy        Assessment & Plan     1. High risk medication use  Basic Metabolic Panel   2. Essential hypertension  lisinopril (PRINIVIL) 40 MG tablet    spironolactone (ALDACTONE) 25 MG Tab   3. ACC/AHA stage C chronic systolic heart failure (HCC)  atorvastatin (LIPITOR) 80 MG tablet    carvedilol (COREG) 25 MG Tab    lisinopril (PRINIVIL) 40 MG tablet    spironolactone (ALDACTONE) 25 MG Tab       Obtain BMP to monitor electrolytes and kidney function as patient is on several high-risk medications.    Decrease spironolactone to 25 mg daily given lower average blood pressures at home with goal blood pressure 120-130/70-80.  Recommend discontinuing spironolactone if continues to have lower blood pressure along with symptoms of lightheadedness.  This has been discussed with the patient and his son who voices understanding.    Refills provided for medications as requested.    Patient is relocating to [place] and will follow-up with a cardiologist there moving forward.      All of patient and his son's excellent questions were answered to the best of my knowledge and to their satisfaction.  It was a pleasure seeing Mr. Ze Wan in my clinic today. Return if symptoms worsen or fail to improve. Patient is aware to call the cardiology clinic with any questions or concerns.      Cristo Esparza MD  [facility]  [facility], [address]  [address] [address]  [place] [address]  Phone: [phone]  Fax: [phone]

## 2021-03-03 DIAGNOSIS — Z23 NEED FOR VACCINATION: ICD-10-CM

## 2021-05-25 ENCOUNTER — APPOINTMENT (RX ONLY)
Dept: URBAN - METROPOLITAN AREA CLINIC 36 | Facility: CLINIC | Age: 66
Setting detail: DERMATOLOGY
End: 2021-05-25

## 2021-05-25 DIAGNOSIS — L90.5 SCAR CONDITIONS AND FIBROSIS OF SKIN: ICD-10-CM

## 2021-05-25 PROCEDURE — ? INTRALESIONAL KENALOG

## 2021-05-25 PROCEDURE — 99024 POSTOP FOLLOW-UP VISIT: CPT

## 2021-05-25 ASSESSMENT — LOCATION SIMPLE DESCRIPTION DERM: LOCATION SIMPLE: RIGHT NOSE

## 2021-05-25 ASSESSMENT — LOCATION ZONE DERM: LOCATION ZONE: NOSE

## 2021-05-25 ASSESSMENT — LOCATION DETAILED DESCRIPTION DERM: LOCATION DETAILED: RIGHT NASAL ALA

## 2021-05-25 NOTE — PROCEDURE: INTRALESIONAL KENALOG
Treatment Number (Optional): 2
Total Volume (Ccs): 0.2
Medical Necessity Clause: This procedure was medically necessary because the lesions that were treated were: draining
Detail Level: Detailed
Concentration Of Kenalog Solution Injected (Mg/Ml): 40.0
Include Z78.9 (Other Specified Conditions Influencing Health Status) As An Associated Diagnosis?: No
Kenalog Preparation: Kenalog
Administered By (Optional): SAJI MUÑOZ
X Size Of Lesion In Cm (Optional): 0
Consent: The risks of atrophy were reviewed with the patient.